# Patient Record
Sex: FEMALE | Race: WHITE | NOT HISPANIC OR LATINO | Employment: FULL TIME | URBAN - METROPOLITAN AREA
[De-identification: names, ages, dates, MRNs, and addresses within clinical notes are randomized per-mention and may not be internally consistent; named-entity substitution may affect disease eponyms.]

---

## 2017-02-13 ENCOUNTER — HOSPITAL ENCOUNTER (OUTPATIENT)
Dept: RADIOLOGY | Age: 57
Discharge: HOME | End: 2017-02-13
Payer: OTHER MISCELLANEOUS

## 2017-02-13 ENCOUNTER — TRANSCRIBE ORDERS (OUTPATIENT)
Dept: URGENT CARE | Age: 57
End: 2017-02-13

## 2017-02-13 ENCOUNTER — APPOINTMENT (OUTPATIENT)
Dept: URGENT CARE | Age: 57
End: 2017-02-13
Payer: OTHER MISCELLANEOUS

## 2017-02-13 DIAGNOSIS — R52 PAIN: ICD-10-CM

## 2017-02-13 DIAGNOSIS — R52 PAIN: Primary | ICD-10-CM

## 2017-02-13 PROCEDURE — 73030 X-RAY EXAM OF SHOULDER: CPT

## 2017-02-13 PROCEDURE — 99213 OFFICE O/P EST LOW 20 MIN: CPT | Performed by: FAMILY MEDICINE

## 2017-02-17 ENCOUNTER — APPOINTMENT (OUTPATIENT)
Dept: URGENT CARE | Age: 57
End: 2017-02-17
Payer: OTHER MISCELLANEOUS

## 2017-02-17 PROCEDURE — 99213 OFFICE O/P EST LOW 20 MIN: CPT | Performed by: FAMILY MEDICINE

## 2017-02-25 ENCOUNTER — APPOINTMENT (OUTPATIENT)
Dept: URGENT CARE | Age: 57
End: 2017-02-25
Payer: OTHER MISCELLANEOUS

## 2017-02-25 PROCEDURE — 99213 OFFICE O/P EST LOW 20 MIN: CPT | Performed by: PREVENTIVE MEDICINE

## 2017-03-04 ENCOUNTER — APPOINTMENT (OUTPATIENT)
Dept: URGENT CARE | Age: 57
End: 2017-03-04
Payer: OTHER MISCELLANEOUS

## 2017-03-04 PROCEDURE — 99213 OFFICE O/P EST LOW 20 MIN: CPT

## 2017-03-11 ENCOUNTER — APPOINTMENT (OUTPATIENT)
Dept: URGENT CARE | Age: 57
End: 2017-03-11
Payer: OTHER MISCELLANEOUS

## 2017-03-11 PROCEDURE — 99283 EMERGENCY DEPT VISIT LOW MDM: CPT

## 2017-03-11 PROCEDURE — G0382 LEV 3 HOSP TYPE B ED VISIT: HCPCS

## 2017-03-15 ENCOUNTER — APPOINTMENT (OUTPATIENT)
Dept: URGENT CARE | Age: 57
End: 2017-03-15
Payer: OTHER MISCELLANEOUS

## 2017-03-15 PROCEDURE — 99213 OFFICE O/P EST LOW 20 MIN: CPT | Performed by: FAMILY MEDICINE

## 2017-03-20 ENCOUNTER — APPOINTMENT (OUTPATIENT)
Dept: URGENT CARE | Age: 57
End: 2017-03-20
Payer: OTHER MISCELLANEOUS

## 2017-03-20 ENCOUNTER — TRANSCRIBE ORDERS (OUTPATIENT)
Dept: URGENT CARE | Age: 57
End: 2017-03-20

## 2017-03-20 ENCOUNTER — HOSPITAL ENCOUNTER (OUTPATIENT)
Dept: RADIOLOGY | Age: 57
Discharge: HOME | End: 2017-03-20
Attending: PREVENTIVE MEDICINE
Payer: OTHER MISCELLANEOUS

## 2017-03-20 DIAGNOSIS — T14.90XA INJURY: Primary | ICD-10-CM

## 2017-03-20 DIAGNOSIS — T14.90XA INJURY: ICD-10-CM

## 2017-03-20 PROCEDURE — 99213 OFFICE O/P EST LOW 20 MIN: CPT | Performed by: PREVENTIVE MEDICINE

## 2017-03-20 PROCEDURE — 72200 X-RAY EXAM SI JOINTS: CPT

## 2017-03-20 PROCEDURE — 72100 X-RAY EXAM L-S SPINE 2/3 VWS: CPT

## 2017-03-22 ENCOUNTER — APPOINTMENT (OUTPATIENT)
Dept: PHYSICAL THERAPY | Age: 57
End: 2017-03-22
Payer: OTHER MISCELLANEOUS

## 2017-03-22 PROCEDURE — 97162 PT EVAL MOD COMPLEX 30 MIN: CPT

## 2017-03-23 ENCOUNTER — APPOINTMENT (OUTPATIENT)
Dept: PHYSICAL THERAPY | Age: 57
End: 2017-03-23
Payer: OTHER MISCELLANEOUS

## 2017-03-23 PROCEDURE — 97110 THERAPEUTIC EXERCISES: CPT

## 2017-03-23 PROCEDURE — 97010 HOT OR COLD PACKS THERAPY: CPT

## 2017-03-27 ENCOUNTER — APPOINTMENT (OUTPATIENT)
Dept: PHYSICAL THERAPY | Age: 57
End: 2017-03-27
Payer: OTHER MISCELLANEOUS

## 2017-03-27 PROCEDURE — 97010 HOT OR COLD PACKS THERAPY: CPT

## 2017-03-27 PROCEDURE — 97110 THERAPEUTIC EXERCISES: CPT

## 2017-03-29 ENCOUNTER — APPOINTMENT (OUTPATIENT)
Dept: PHYSICAL THERAPY | Age: 57
End: 2017-03-29
Payer: OTHER MISCELLANEOUS

## 2017-03-29 ENCOUNTER — APPOINTMENT (OUTPATIENT)
Dept: URGENT CARE | Age: 57
End: 2017-03-29
Payer: OTHER MISCELLANEOUS

## 2017-03-29 PROCEDURE — 97010 HOT OR COLD PACKS THERAPY: CPT

## 2017-03-29 PROCEDURE — 97110 THERAPEUTIC EXERCISES: CPT

## 2017-03-29 PROCEDURE — 99213 OFFICE O/P EST LOW 20 MIN: CPT | Performed by: FAMILY MEDICINE

## 2017-03-30 ENCOUNTER — APPOINTMENT (OUTPATIENT)
Dept: PHYSICAL THERAPY | Age: 57
End: 2017-03-30
Payer: OTHER MISCELLANEOUS

## 2017-03-30 PROCEDURE — 97010 HOT OR COLD PACKS THERAPY: CPT

## 2017-03-30 PROCEDURE — 97110 THERAPEUTIC EXERCISES: CPT

## 2017-04-03 ENCOUNTER — APPOINTMENT (OUTPATIENT)
Dept: PHYSICAL THERAPY | Age: 57
End: 2017-04-03
Payer: OTHER MISCELLANEOUS

## 2017-04-03 PROCEDURE — 97010 HOT OR COLD PACKS THERAPY: CPT

## 2017-04-03 PROCEDURE — 97110 THERAPEUTIC EXERCISES: CPT

## 2017-04-05 ENCOUNTER — APPOINTMENT (OUTPATIENT)
Dept: PHYSICAL THERAPY | Age: 57
End: 2017-04-05
Payer: OTHER MISCELLANEOUS

## 2017-04-05 PROCEDURE — 97010 HOT OR COLD PACKS THERAPY: CPT

## 2017-04-05 PROCEDURE — 97110 THERAPEUTIC EXERCISES: CPT

## 2017-04-06 ENCOUNTER — APPOINTMENT (OUTPATIENT)
Dept: PHYSICAL THERAPY | Age: 57
End: 2017-04-06
Payer: OTHER MISCELLANEOUS

## 2017-04-06 PROCEDURE — 97110 THERAPEUTIC EXERCISES: CPT

## 2017-04-06 PROCEDURE — 97010 HOT OR COLD PACKS THERAPY: CPT

## 2017-04-07 ENCOUNTER — APPOINTMENT (OUTPATIENT)
Dept: URGENT CARE | Age: 57
End: 2017-04-07
Payer: OTHER MISCELLANEOUS

## 2017-04-07 PROCEDURE — 99213 OFFICE O/P EST LOW 20 MIN: CPT | Performed by: FAMILY MEDICINE

## 2017-04-11 ENCOUNTER — APPOINTMENT (OUTPATIENT)
Dept: PHYSICAL THERAPY | Age: 57
End: 2017-04-11
Payer: OTHER MISCELLANEOUS

## 2017-04-12 ENCOUNTER — APPOINTMENT (OUTPATIENT)
Dept: PHYSICAL THERAPY | Age: 57
End: 2017-04-12
Payer: OTHER MISCELLANEOUS

## 2017-04-12 PROCEDURE — 97110 THERAPEUTIC EXERCISES: CPT

## 2017-04-13 ENCOUNTER — APPOINTMENT (OUTPATIENT)
Dept: PHYSICAL THERAPY | Age: 57
End: 2017-04-13
Payer: OTHER MISCELLANEOUS

## 2017-04-13 PROCEDURE — 97110 THERAPEUTIC EXERCISES: CPT

## 2017-04-13 PROCEDURE — 97010 HOT OR COLD PACKS THERAPY: CPT

## 2017-04-18 ENCOUNTER — APPOINTMENT (OUTPATIENT)
Dept: PHYSICAL THERAPY | Age: 57
End: 2017-04-18
Payer: OTHER MISCELLANEOUS

## 2017-04-19 ENCOUNTER — APPOINTMENT (OUTPATIENT)
Dept: URGENT CARE | Age: 57
End: 2017-04-19
Payer: OTHER MISCELLANEOUS

## 2017-04-19 ENCOUNTER — APPOINTMENT (OUTPATIENT)
Dept: PHYSICAL THERAPY | Age: 57
End: 2017-04-19
Payer: OTHER MISCELLANEOUS

## 2017-04-19 PROCEDURE — 97110 THERAPEUTIC EXERCISES: CPT

## 2017-04-19 PROCEDURE — 99213 OFFICE O/P EST LOW 20 MIN: CPT | Performed by: PREVENTIVE MEDICINE

## 2017-06-02 ENCOUNTER — ALLSCRIPTS OFFICE VISIT (OUTPATIENT)
Dept: OTHER | Facility: OTHER | Age: 57
End: 2017-06-02

## 2017-10-13 ENCOUNTER — TRANSCRIBE ORDERS (OUTPATIENT)
Dept: URGENT CARE | Age: 57
End: 2017-10-13

## 2017-10-13 ENCOUNTER — APPOINTMENT (OUTPATIENT)
Dept: URGENT CARE | Age: 57
End: 2017-10-13
Payer: OTHER MISCELLANEOUS

## 2017-10-13 ENCOUNTER — APPOINTMENT (OUTPATIENT)
Dept: RADIOLOGY | Age: 57
End: 2017-10-13
Attending: PREVENTIVE MEDICINE
Payer: OTHER MISCELLANEOUS

## 2017-10-13 DIAGNOSIS — T14.90XA INJURY: ICD-10-CM

## 2017-10-13 DIAGNOSIS — T14.90XA INJURY: Primary | ICD-10-CM

## 2017-10-13 PROCEDURE — 73130 X-RAY EXAM OF HAND: CPT

## 2017-10-13 PROCEDURE — 73110 X-RAY EXAM OF WRIST: CPT

## 2017-10-13 PROCEDURE — G0382 LEV 3 HOSP TYPE B ED VISIT: HCPCS | Performed by: FAMILY MEDICINE

## 2017-10-13 PROCEDURE — 99283 EMERGENCY DEPT VISIT LOW MDM: CPT | Performed by: FAMILY MEDICINE

## 2017-10-18 ENCOUNTER — APPOINTMENT (OUTPATIENT)
Dept: URGENT CARE | Age: 57
End: 2017-10-18
Payer: OTHER MISCELLANEOUS

## 2017-10-18 PROCEDURE — 99213 OFFICE O/P EST LOW 20 MIN: CPT | Performed by: PREVENTIVE MEDICINE

## 2017-10-23 ENCOUNTER — APPOINTMENT (OUTPATIENT)
Dept: URGENT CARE | Age: 57
End: 2017-10-23
Payer: OTHER MISCELLANEOUS

## 2017-10-23 PROCEDURE — 99213 OFFICE O/P EST LOW 20 MIN: CPT | Performed by: PREVENTIVE MEDICINE

## 2017-10-30 ENCOUNTER — APPOINTMENT (OUTPATIENT)
Dept: URGENT CARE | Age: 57
End: 2017-10-30
Payer: OTHER MISCELLANEOUS

## 2017-10-30 PROCEDURE — 99213 OFFICE O/P EST LOW 20 MIN: CPT | Performed by: PREVENTIVE MEDICINE

## 2017-11-04 ENCOUNTER — APPOINTMENT (OUTPATIENT)
Dept: URGENT CARE | Age: 57
End: 2017-11-04
Payer: OTHER MISCELLANEOUS

## 2017-11-04 PROCEDURE — 99213 OFFICE O/P EST LOW 20 MIN: CPT | Performed by: PREVENTIVE MEDICINE

## 2017-11-12 ENCOUNTER — APPOINTMENT (OUTPATIENT)
Dept: URGENT CARE | Age: 57
End: 2017-11-12
Payer: OTHER MISCELLANEOUS

## 2017-11-12 PROCEDURE — 99213 OFFICE O/P EST LOW 20 MIN: CPT | Performed by: PREVENTIVE MEDICINE

## 2018-01-11 NOTE — RESULT NOTES
Verified Results  * MAMMO SCREENING BILATERAL W CAD [id number] 08:02AM Lashaun Miller     Test Name Result Flag Reference   MAMMO SCREENING BILATERAL W CAD (Report)     Patient History:   Patient is postmenopausal and has history of ovarian cancer at    age 51  Family history of breast cancer in mother at age 76 and breast    cancer in sister at age 48  Benign excisional biopsy of the left breast, July 13, 2002  Core   biopsy of the left breast  Core biopsy of the right breast    Patient is a former smoker  Patient's BMI is 31 2  Reason for exam: screening (asymptomatic)  Mammo Screening Bilateral W CAD: February 10, 2016 - Check In #:    [de-identified]   Bilateral CC and MLO view(s) were taken  Technologist: MILAGROS Marie (R)(M)   Prior study comparison: January 28, 2015, bilateral digital    screening mammogram, performed at [place] [address]  January 22, 2014, bilateral digital screening    mammogram, performed at [place] [address]  December 3, 2012, bilateral digital screening mammogram,   performed at [place] [address]  November 16, 2011, bilateral digital screening mammogram,    performed at [place] [address]  November 9, 2010, bilateral digital screening mammogram,    performed at [place] [address]  October 19, 2009, bilateral digital screening mammogram, performed at    [place] [address]  October 16, 2008,    bilateral DIGITAL SCREENING MAMMOGRAM, performed at [place] [address]  There are scattered fibroglandular densities  The parenchymal pattern appears stable  No dominant soft tissue    mass or suspicious calcifications are noted  Scattered benign    appearing calcifications are seen  Stable nodular densities are    seen in both breasts   The skin and nipple contours are within    normal limits  No mammographic evidence of malignancy  No    significant changes when compared with prior studies  ASSESSMENT: BiRad:2 - Benign     Recommendation:   Routine screening mammogram in 1 year  A reminder letter will be   scheduled  Analyzed by CAD     8-10% of cancers will be missed on mammography  Management of a    palpable abnormality must be based on clinical grounds  Patients   will be notified of their results via letter from our facility  Accredited by [employer] of [employer] and FDA  Transcription Location: [facility]: [id number]     Risk Value(s):   Tyrer-Cuzick 10 Year: 12 941%, Tyrer-Cuzick Lifetime: 34 305%,    Myriad Table: 7 7%, NAILA 5 Year: 9 3%, NCI Lifetime: 46 9%, MRS    : Based on personal and/or family history,    consideration of hereditary risk assessment may be warranted     Signed by:   Shlomo Yung MD   2/10/16

## 2018-01-14 VITALS
WEIGHT: 158.13 LBS | DIASTOLIC BLOOD PRESSURE: 82 MMHG | SYSTOLIC BLOOD PRESSURE: 132 MMHG | HEIGHT: 61 IN | RESPIRATION RATE: 18 BRPM | TEMPERATURE: 99.3 F | BODY MASS INDEX: 29.86 KG/M2 | HEART RATE: 72 BPM

## 2018-01-15 NOTE — PROGRESS NOTES
Assessment    1  Encounter for preventive health examination (V70 0) (Z00 00)   2  Flu vaccine need (V04 81) (Z23)    Plan  Coronary arteriosclerosis    · From  Simvastatin 80 MG Oral Tablet take one tablet by mouth one time daily  To Simvastatin 80 MG Oral Tablet take one-half  tablet by mouth one time daily or as  directed   · Metoprolol Tartrate 25 MG Oral Tablet; TAKE ONE-HALF TABLET BY MOUTH  TWICE DAILY   · (1) COMPREHENSIVE METABOLIC PANEL; Status:Active; Requested for:04Nov2016;   Flu vaccine need    · Fluzone Quadrivalent Intramuscular Suspension  Health Maintenance    · Vitamin D3 1000 UNIT Oral Capsule; take 1 capsule daily    Discussion/Summary  health maintenance visit has not seen Gyn- Oncology Discussed Breast cancer screening: mammogram is current and 2/16  Colorectal cancer screening: colorectal cancer screening is current and 2009  Osteoporosis screening: can use Vitamin D 1000  The rcvd flu shot  She was advised to be evaluated by an optometrist and a dentist      She has been feeling well since last here - no further dizziness, no CP , SOB etc  Carotids w no sig stenosis 2015, , CBC/CMP/ Lipid/UA/TSH were fine 2015 - redo CMP       BP elevated today Use Metoprolol 1/2 twice daily w goal 120-130/80 (was only on once a day)  Stay w Aspirin/ Re Simvstatin - w/ thigh cramps- ** decrease to 1/2 pill a day **  Control cardiac risk w CAD hx - declines stress testing or Cardiology re-eval for now  Visit here 6-8 months     Discussed re-eval w Gyn-Oncology- Dr Reyes Nephew - had stage I ovarian ca - Also w hx RLL lobectomy re early carcinoid lung - could do CT scanning chest/ abd/ pelvis - declines for now       Chief Complaint  Physical      History of Present Illness  HPI: in for check - had PE in Dec but wants PE as losing coverage x 3 mo - switching jobs away from [employer] to other [employer] facility    Notes some cramping thighs - only using Metoprolol 12 5 once a day  Remains on Simvastatin 80      Review of Systems    Constitutional: No fever, no chills, feels well, no tiredness, no recent weight gain or weight loss  Cardiovascular: No complaints of slow heart rate, no fast heart rate, no chest pain, no palpitations, no leg claudication, no lower extremity edema  Respiratory: No complaints of shortness of breath, no wheezing, no cough, no SOB on exertion, no orthopnea, no PND  Gastrointestinal: no reflux, but No complaints of abdominal pain, no constipation, no nausea or vomiting, no diarrhea, no bloody stools  Genitourinary: no dysuria and no pelvic pain  Musculoskeletal: some thigh cramping at times R > L, but no arthralgias  Integumentary: no rashes and no skin lesions  Neurological: no headache, no confusion, no dizziness, no limb weakness, no convulsions, no fainting and no difficulty walking  Psychiatric: Not suicidal, no sleep disturbance, no anxiety or depression, no change in personality, no emotional problems  Endocrine: No complaints of proptosis, no hot flashes, no muscle weakness, no deepening of the voice, no feelings of weakness  Hematologic/Lymphatic: No complaints of swollen glands, no swollen glands in the neck, does not bleed easily, does not bruise easily  Active Problems    1  History of Carcinoid Lung Neoplasm (235 7)   2  Carotid stenosis (433 10) (I65 29)   3  Coronary arteriosclerosis (414 00) (I25 10)   4  Deep Venous Insufficiency Of The Left Leg (459 81)   5  Heart murmur (785 2) (R01 1)   6  History of Lung Lobectomy   7  History of Ovarian Cancer (V10 43)   8  History of Thrombophlebitis (V12 52)   9  Tinnitus (388 30) (H93 19)   10   Vertigo (780 4) (R42)    Past Medical History    · History of Carcinoid Lung Neoplasm (235 7)   · History of Ovarian Cancer (V10 43)   · History of Thrombophlebitis (V12 52)    Surgical History    · History of Biopsy Breast Percutaneous Needle Core   · History of Cath Stent Placement   · History of Complete Colonoscopy   · History of Lung Lobectomy   · History of Total Abdominal Hysterectomy With Removal Of Both Ovaries   · History of Treatment Of Wrist Fracture    Family History  Mother    · Family history of Breast Cancer (V16 3)   · Family history of Diabetes Mellitus (V18 0)    Social History    · Former smoker (V15 82) (P22 765)   · Full-time employment   · warehouse work   · Minimum alcohol consumption    Current Meds   1  Aspirin 81 MG TABS; TAKE 1 TABLET DAILY; Therapy: (Maria Varsha) to Recorded   2  Caltrate 600+D 600-400 MG-UNIT TABS; Take 1 tablet twice daily; Therapy: (Maria Varsha) to Recorded   3  Fish Oil 1200 MG Oral Capsule; TAKE 1 CAPSULE Daily; Therapy: (Maria Varsha) to Recorded   4  Metoprolol Tartrate 25 MG Oral Tablet; TAKE ONE-HALF TABLET BY MOUTH TWICE   DAILY; Therapy: 20PLS6104 to (Evaluate:11Wdw5116)  Requested for: 20Hbm9374; Last   Rx:[id number] Ordered   5  Simvastatin 80 MG Oral Tablet; take one tablet by mouth one time daily; Therapy: 46FXB9162 to (Evaluate:89Nqr8541)  Requested for: 25Xlm8101; Last   Rx:[id number] Ordered    Allergies    1  Iodinated Contrast Media   2  Niacin TABS    Vitals   Recorded: 46JGX8675 73:64WC   Systolic 105   Diastolic 80   Heart Rate 68   Height 5 ft 1 in   Weight 170 lb 4 96 oz   BMI Calculated 32 18   BSA Calculated 1 76     Physical Exam    Constitutional   General appearance: No acute distress, well appearing and well nourished  Head and Face   Head and face: Normal     Palpation of the face and sinuses: No sinus tenderness  Neck   Neck: Supple, symmetric, trachea midline, no masses  Thyroid: Normal, no thyromegaly  Pulmonary   Auscultation of lungs: Clear to auscultation  Cardiovascular   Auscultation of heart: Normal rate and rhythm, normal S1 and S2, no murmurs  Carotid pulses: 2+ bilaterally  Examination of extremities for edema and/or varicosities: Normal     Lymphatic   Palpation of lymph nodes in neck: No lymphadenopathy  Musculoskeletal   Gait and station: Normal     Stability: Normal     Muscle strength/tone: Normal     Neurologic   Cortical function: Normal mental status  Coordination: Normal finger to nose and heel to shin  Psychiatric   Judgment and insight: Normal     Orientation to person, place, and time: Normal     Recent and remote memory: Intact  Mood and affect: Normal        Results/Data  PHQ-2 Adult Depression Screening [id number] 02:02PM User, Poncho     Test Name Result Flag Reference   PHQ-2 Adult Depression Score 0     Over the last two weeks, how often have you been bothered by any of the following problems?   Little interest or pleasure in doing things: Not at all - 0  Feeling down, depressed, or hopeless: Not at all - 0   PHQ-2 Adult Depression Screening Negative         Signatures   Electronically signed by : Chritsen Soria DO; Nov 4 2016  2:13PM EST                       (Author)

## 2018-01-16 NOTE — RESULT NOTES
Verified Results  (1) COMPREHENSIVE METABOLIC PANEL [id number] 85:64NR Jamari Roberts     Test Name Result Flag Reference   GLUCOSE,RANDM 104 mg/dL     If the patient is fasting, the ADA then defines impaired fasting glucose as > 100 mg/dL and diabetes as > or equal to 123 mg/dL  SODIUM 141 mmol/L  136-145   POTASSIUM 4 3 mmol/L  3 5-5 3   CHLORIDE 104 mmol/L  100-108   CARBON DIOXIDE 29 mmol/L  21-32   ANION GAP (CALC) 8 mmol/L  4-13   BLOOD UREA NITROGEN 12 mg/dL  5-25   CREATININE 0 68 mg/dL  0 60-1 30   Standardized to IDMS reference method   CALCIUM 9 0 mg/dL  8 3-10 1   BILI, TOTAL 1 16 mg/dL H 0 20-1 00   ALK PHOSPHATAS 40 U/L L    ALT (SGPT) 40 U/L  12-78   AST(SGOT) 21 U/L  5-45   ALBUMIN 3 7 g/dL  3 5-5 0   TOTAL PROTEIN 7 5 g/dL  6 4-8 2   eGFR Non-African American      >60 0 ml/min/1 73sq [facility] [employer] Disease Education Program recommendations are as follows:  GFR calculation is accurate only with a steady state creatinine  Chronic Kidney disease less than 60 ml/min/1 73 sq  meters  Kidney failure less than 15 ml/min/1 73 sq  meters

## 2018-04-09 DIAGNOSIS — I25.10 CORONARY ARTERIOSCLEROSIS: Primary | ICD-10-CM

## 2018-04-09 RX ORDER — AMOXICILLIN 500 MG
2 CAPSULE ORAL DAILY
COMMUNITY

## 2018-04-09 RX ORDER — SIMVASTATIN 80 MG
1 TABLET ORAL DAILY
COMMUNITY
Start: 2011-11-21 | End: 2018-04-09 | Stop reason: SDUPTHER

## 2018-04-09 RX ORDER — BIOTIN 1 MG
1 TABLET ORAL DAILY
COMMUNITY
Start: 2016-11-04

## 2018-04-10 RX ORDER — SIMVASTATIN 80 MG
80 TABLET ORAL DAILY
Qty: 90 TABLET | Refills: 0 | Status: SHIPPED | OUTPATIENT
Start: 2018-04-10 | End: 2018-05-14 | Stop reason: SDUPTHER

## 2018-04-10 NOTE — TELEPHONE ENCOUNTER
Phoned patient, betito on her mobile # asking patient to call back the office to set up a PE with Dr Rachel Day

## 2018-05-12 PROBLEM — Z90.710 S/P TAH-BSO: Status: ACTIVE | Noted: 2018-05-12

## 2018-05-12 PROBLEM — I25.2 HISTORY OF MYOCARDIAL INFARCTION: Status: ACTIVE | Noted: 2018-05-12

## 2018-05-12 PROBLEM — Z90.722 S/P TAH-BSO: Status: ACTIVE | Noted: 2018-05-12

## 2018-05-12 PROBLEM — Z95.5 H/O HEART ARTERY STENT: Status: ACTIVE | Noted: 2018-05-12

## 2018-05-12 PROBLEM — C56.9 OVARIAN CANCER (HCC): Status: ACTIVE | Noted: 2018-05-12

## 2018-05-12 PROBLEM — C56.1 MALIGNANT NEOPLASM OF RIGHT OVARY (HCC): Status: ACTIVE | Noted: 2018-05-12

## 2018-05-12 PROBLEM — Z90.79 S/P TAH-BSO: Status: ACTIVE | Noted: 2018-05-12

## 2018-05-12 PROBLEM — D3A.090 CARCINOID TUMOR OF LUNG: Status: ACTIVE | Noted: 2018-05-12

## 2018-05-12 NOTE — PROGRESS NOTES
FAMILY PRACTICE OFFICE VISIT  Tana JIMENEZ O  [facility]  [address] [facility] [address]  [address] [place], [place], [address]      NAME: Lc Vann  AGE: 61 y o  SEX: female  : 1960   MRN: [id number]    DATE: 2018  TIME: 9:07 AM    Assessment and Plan     Problem List Items Addressed This Visit        Respiratory    Carcinoid tumor of lung       Cardiovascular and Mediastinum    Coronary arteriosclerosis    Relevant Medications    simvastatin (ZOCOR) 80 mg tablet    metoprolol tartrate (LOPRESSOR) 25 mg tablet    Other Relevant Orders    Comprehensive metabolic panel    CBC    Lipid panel       Genitourinary    Malignant neoplasm of right ovary (HCC)       Other    H/O heart artery stent    History of myocardial infarction      Other Visit Diagnoses     Well adult health check    -  Primary    Need for hepatitis C screening test        Relevant Orders    Hepatitis C antibody    Essential hypertension        Relevant Medications    metoprolol tartrate (LOPRESSOR) 25 mg tablet    Other Relevant Orders    Urinalysis with microscopic          Patient Instructions     She is in today for a routine physical/ check up      She is doing well here today   But her blood pressure is not ideal, she will continue on metoprolol as is, I would like her to return in 1 month for staff to do blood pressure check, I would also like her to check blood pressure outside of office, if blood pressure remains high we will plan to add valsartan  mg          Immunization History   Administered Date(s) Administered    Influenza Quadrivalent, 6-35 Months IM 2016    Influenza TIV (IM) 12/10/2012       She does not do yearly Flu shot     Should do  Tdap/tetanus shot will be done at a future date  (done every 10 yrs for superficial cuts, every 5 yrs for deep wounds)   Can also look into coverage for new shingles shot, Shingrix (indicated if over 48years of age ) Can do that at pharmacy  Is a former smoker, quit 10 years ago     Regarding Colon Cancer screening, we discussed Colonoscopy vs ColoGuard is indicated starting at age 48     2009 was last colonoscopy -  She has number to set up [facility]/ Dr Tamica Rodriguez)    She does not see her Gynecologist routinely  see other info re SEKOU BSO re st I ovarian cancer  Discussed screening Mammogram, this is   done every 1-2 yrs, has slip to do one  Hepatis C Screening indicated for 'baby boomers' -  after discussion she will do Hepatitis C screen  low risk    We did review previous blood work, last TSH ok 2015   Slip given for fasting Lipid/ CMP/CBC  She is not up to date with Lipid screening  Last LDL 66 2015 -  Is on statin, Aspirin, BBlocker re MI hx/ CAD w hx stent -   Discussed doing Cardiology evaluation/ stress testing -  She wishes to hold off, use meds as is and needs eval if any CP or inc SOB  She is not up to date with Diabetes screening  Discussed importance of routine exercise, healthy diet  Remains active w work      Also - should see Dentist routinely, and also should see Eye Dr if any vision changes  We will see her again in 6 months, sooner as needed             Chief Complaint     Chief Complaint   Patient presents with    Physical Exam       History of Present Illness   Mariam Tobin is a 61y o -year-old female who  is in today for a regular check, since I last saw her she has been feeling well, she had undergone physical therapy regarding a worker's Comp injury a little over 1 year ago  Also had injury right hand  Continues to be very active with work, working at [address] [place] [address] Road: Negative for activity change, appetite change, fatigue, fever and unexpected weight change  HENT: Negative for mouth sores, sore throat and trouble swallowing  Eyes: Negative for visual disturbance     Respiratory: Negative for cough, chest tightness and shortness of breath  Remote history lobectomy regarding early carcinoid tumor, no follow-up was advised, she is over 10 years out from smoking cessation, no shortness of breath, cough, dyspnea on exertion  Cardiovascular: Negative for chest pain, palpitations and leg swelling  No exertional complaints, feels very good at work   Gastrointestinal: Negative for abdominal pain and blood in stool  Genitourinary: Negative for dysuria and hematuria  She does not see a gynecologist, remote history SEKOU BSO, had presented with large left ovarian cyst, incidentally found to have right stage I ovarian cancer  Musculoskeletal: Negative for back pain  Neurological: Negative for dizziness, light-headedness and headaches  Hematological: Does not bruise/bleed easily  Psychiatric/Behavioral: Negative for behavioral problems  Active Problem List     Patient Active Problem List   Diagnosis    Vertigo    Tinnitus    Heart murmur    Venous (peripheral) insufficiency    Coronary arteriosclerosis    Carotid stenosis    H/O heart artery stent    History of myocardial infarction    Carcinoid tumor of lung    Malignant neoplasm of right ovary ([address] )    S/P SEKOU-BSO       Past Medical History:  Past Medical History:   Diagnosis Date    Carcinoid tumor of lung 2009    Carcinoid lung neoplasm - stage 1 w resection 2010; last assessed - 17Jun2013    Ovarian cancer ([facility] [address] ) [id number]    Right stage 1; 67SCE1525    Thrombophlebitis of arm, left     Thrombophlebitis left; last assessed - 29MDE5522       Past Surgical History:  Past Surgical History:   Procedure Laterality Date    BREAST BIOPSY      Biopsy Breast Percutaneous needle core    CLOSED REDUCTION WRIST FRACTURE Left 07/20/2011    COLONOSCOPY  05/2009    Complete Colonoscopy    CORONARY ANGIOPLASTY WITH STENT PLACEMENT      LUNG LOBECTOMY      RLL w/early stage carcinoid tumor;  Resolved - Approx [id number]    TOTAL ABDOMINAL HYSTERECTOMY W/ BILATERAL SALPINGOOPHORECTOMY         Family History:  Family History   Problem Relation Age of Onset   Joceline Mcneal Breast cancer Mother     Diabetes Mother      Diabetes Mellitus       Social History:  Social History     Social History    Marital status:      Spouse name: N/A    Number of children: N/A    Years of education: N/A     Occupational History    Warehouse work - Full-Time      Social History Main Topics    Smoking status: Former Smoker    Smokeless tobacco: Never Used    Alcohol use Yes      Comment: minimum alcohol consumption    Drug use: Unknown    Sexual activity: Not on file     Other Topics Concern    Not on file     Social History Narrative    No narrative on file       Objective     Vitals:    05/14/18 0825   BP: 158/82   Pulse: 56   Resp: 18   SpO2: 98%   Weight: 73 5 kg (162 lb)   Height: 5' 1" (1 549 m)     Body mass index is 30 61 kg/m²  BP Readings from Last 3 Encounters:   05/14/18 158/82   06/02/17 132/82   11/04/16 154/80       Wt Readings from Last 3 Encounters:   05/14/18 73 5 kg (162 lb)   06/02/17 71 7 kg (158 lb 2 1 oz)   11/04/16 77 3 kg (170 lb 4 9 oz)       Physical Exam   Constitutional: She is oriented to person, place, and time  She appears well-developed and well-nourished  No distress  HENT:   Mouth/Throat: Oropharynx is clear and moist  No oropharyngeal exudate  TM clear   Eyes: Conjunctivae are normal  No scleral icterus  Cardiovascular: Normal rate and regular rhythm  Murmur (1/6 GIOVANA LSB) heard  No carotid bruit   Pulmonary/Chest: Effort normal and breath sounds normal  No respiratory distress  Abdominal: Soft  There is no tenderness  Musculoskeletal: She exhibits no edema  Lymphadenopathy:     She has no cervical adenopathy  Neurological: She is alert and oriented to person, place, and time  Psychiatric: She has a normal mood and affect   Her behavior is normal        ALLERGIES:  Allergies   Allergen Reactions    Contrast  [Iodinated Diagnostic Agents] Hives    Iodine      Other reaction(s): Other (See Comments)  Contrast dye    Niacin      [facility] - [id number]: felt poor w med       Current Medications     Current Outpatient Prescriptions   Medication Sig Dispense Refill    aspirin 81 MG tablet Take 1 tablet by mouth daily      Cholecalciferol (VITAMIN D3) 1000 units CAPS Take 1 capsule by mouth daily      metoprolol tartrate (LOPRESSOR) 25 mg tablet Take 0 5 tablets (12 5 mg total) by mouth 2 (two) times a day 90 tablet 3    Omega-3 Fatty Acids (FISH OIL) 1200 MG CAPS Take 1 capsule by mouth daily      simvastatin (ZOCOR) 80 mg tablet Take 1 tablet (80 mg total) by mouth daily 90 tablet 3     No current facility-administered medications for this visit                Most recent labs available from [address] [place] [address]   ( others may be available in [facility] / Media sections)  Lab Results   Component Value Date    WBC 4 64 07/01/2015    HGB 13 1 07/01/2015    HCT 40 0 07/01/2015     07/01/2015    CHOL 124 07/01/2015    TRIG 65 07/01/2015    HDL 45 07/01/2015    ALT 40 11/05/2016    AST 21 11/05/2016     11/05/2016    K 4 3 11/05/2016     11/05/2016    CREATININE 0 68 11/05/2016    BUN 12 11/05/2016    CO2 29 11/05/2016     Lab Results   Component Value Date    LDLCALC 66 07/01/2015     No results found for: TSH      Orders Placed This Encounter   Procedures    Hepatitis C antibody    Comprehensive metabolic panel    CBC    Lipid panel    Urinalysis with microscopic    Hm Colonoscopy         Fani Rosario DO

## 2018-05-12 NOTE — PATIENT INSTRUCTIONS
She is in today for a routine physical/ check up      She is doing well here today   But her blood pressure is not ideal, she will continue on metoprolol as is, I would like her to return in 1 month for staff to do blood pressure check, I would also like her to check blood pressure outside of office, if blood pressure remains high we will plan to add valsartan  mg          Immunization History   Administered Date(s) Administered    Influenza Quadrivalent, 6-35 Months IM 11/04/2016    Influenza TIV (IM) 12/10/2012       She does not do yearly Flu shot  Should do  Tdap/tetanus shot will be done at a future date  (done every 10 yrs for superficial cuts, every 5 yrs for deep wounds)   Can also look into coverage for new shingles shot, Shingrix (indicated if over 48years of age ) Can do that at pharmacy  Is a former smoker, quit 10 years ago     Regarding Colon Cancer screening, we discussed Colonoscopy vs ColoGuard is indicated starting at age 48     2009 was last colonoscopy -  [facility] Dr Florence Greene)    She does not see her Gynecologist routinely  see other info re SEKOU BSO re st I ovarian cancer  Discussed screening Mammogram, this is   done every 1-2 yrs, has slip to do one  Hepatis C Screening indicated for 'baby boomers' -  after discussion she will do Hepatitis C screen  low risk    We did review previous blood work, last TSH ok 2015   Slip given for fasting Lipid/ CMP/CBC  She is not up to date with Lipid screening  Last LDL 66 2015 -  Is on statin, Aspirin, BBlocker re MI hx/ CAD w hx stent -   Discussed doing Cardiology evaluation/ stress testing -  She wishes to hold off, use meds as is and needs eval if any CP or inc SOB  She is not up to date with Diabetes screening  Discussed importance of routine exercise, healthy diet  Remains active w work      Also - should see Dentist routinely, and also should see Eye Dr if any vision changes      We will see her again in 6 months, sooner as needed

## 2018-05-14 ENCOUNTER — OFFICE VISIT (OUTPATIENT)
Dept: FAMILY MEDICINE CLINIC | Facility: CLINIC | Age: 58
End: 2018-05-14
Payer: COMMERCIAL

## 2018-05-14 VITALS
DIASTOLIC BLOOD PRESSURE: 82 MMHG | HEIGHT: 61 IN | HEART RATE: 56 BPM | RESPIRATION RATE: 18 BRPM | BODY MASS INDEX: 30.58 KG/M2 | WEIGHT: 162 LBS | SYSTOLIC BLOOD PRESSURE: 158 MMHG | OXYGEN SATURATION: 98 %

## 2018-05-14 DIAGNOSIS — Z11.59 NEED FOR HEPATITIS C SCREENING TEST: ICD-10-CM

## 2018-05-14 DIAGNOSIS — Z00.00 WELL ADULT HEALTH CHECK: Primary | ICD-10-CM

## 2018-05-14 DIAGNOSIS — I10 ESSENTIAL HYPERTENSION: ICD-10-CM

## 2018-05-14 DIAGNOSIS — I25.2 HISTORY OF MYOCARDIAL INFARCTION: ICD-10-CM

## 2018-05-14 DIAGNOSIS — C56.1 MALIGNANT NEOPLASM OF RIGHT OVARY (HCC): ICD-10-CM

## 2018-05-14 DIAGNOSIS — I25.10 CORONARY ARTERIOSCLEROSIS: ICD-10-CM

## 2018-05-14 DIAGNOSIS — Z95.5 H/O HEART ARTERY STENT: ICD-10-CM

## 2018-05-14 DIAGNOSIS — D3A.090 CARCINOID TUMOR OF LUNG: ICD-10-CM

## 2018-05-14 PROCEDURE — 99396 PREV VISIT EST AGE 40-64: CPT | Performed by: FAMILY MEDICINE

## 2018-05-14 RX ORDER — SIMVASTATIN 80 MG
80 TABLET ORAL DAILY
Qty: 90 TABLET | Refills: 3 | Status: SHIPPED | OUTPATIENT
Start: 2018-05-14 | End: 2018-05-23 | Stop reason: SDUPTHER

## 2018-05-23 DIAGNOSIS — I25.10 CORONARY ARTERIOSCLEROSIS: ICD-10-CM

## 2018-05-24 RX ORDER — SIMVASTATIN 80 MG
80 TABLET ORAL DAILY
Qty: 30 TABLET | Refills: 1 | Status: SHIPPED | OUTPATIENT
Start: 2018-05-24 | End: 2019-07-02 | Stop reason: SDUPTHER

## 2018-05-29 ENCOUNTER — TELEPHONE (OUTPATIENT)
Dept: FAMILY MEDICINE CLINIC | Facility: CLINIC | Age: 58
End: 2018-05-29

## 2018-05-29 NOTE — TELEPHONE ENCOUNTER
Pt called and left a message on the nurse line stating there was some miscommunication at the pharmacy regarding her simvastatin and she would like to know if it would be ok for her to stop taking it for 30 days  I called pt back to find out more information but no answer  LMOVM for her to call back regarding her message

## 2018-05-29 NOTE — TELEPHONE ENCOUNTER
Pt called back and explained that [facility] had made a mistake when they mailed her medications and sent her 2 bottles of metoprolol and no bottles of simvastatin and when she called them about it, they told her she had waited too long and there was nothing they could do about it so she cannot get it until July  Pt spoke with kolby and was told it would cost $55 for her to get it because insurance will not cover it since the simvastatin that was supposedly sent to her was through the insurance  Pt wanted to know if she should just stop for a month or if she should just pay for it  Please advise

## 2018-05-30 NOTE — TELEPHONE ENCOUNTER
CALL HER -   [employer] site states she can print a coupon for 90 of the 80mg Simvastatin and pay $15 52 at [facility]  -   Please have her try that  It really does not sound fair that they sent wrong med and will not cover it though

## 2018-06-01 ENCOUNTER — APPOINTMENT (OUTPATIENT)
Dept: LAB | Facility: HOSPITAL | Age: 58
End: 2018-06-01
Payer: COMMERCIAL

## 2018-06-01 DIAGNOSIS — Z11.59 NEED FOR HEPATITIS C SCREENING TEST: ICD-10-CM

## 2018-06-01 LAB
ALBUMIN SERPL BCP-MCNC: 3.7 G/DL (ref 3.5–5)
ALP SERPL-CCNC: 60 U/L (ref 46–116)
ALT SERPL W P-5'-P-CCNC: 41 U/L (ref 12–78)
ANION GAP SERPL CALCULATED.3IONS-SCNC: 6 MMOL/L (ref 4–13)
AST SERPL W P-5'-P-CCNC: 27 U/L (ref 5–45)
BACTERIA UR QL AUTO: ABNORMAL /HPF
BILIRUB SERPL-MCNC: 0.96 MG/DL (ref 0.2–1)
BILIRUB UR QL STRIP: NEGATIVE
BUN SERPL-MCNC: 13 MG/DL (ref 5–25)
CALCIUM SERPL-MCNC: 9.6 MG/DL (ref 8.3–10.1)
CHLORIDE SERPL-SCNC: 107 MMOL/L (ref 100–108)
CHOLEST SERPL-MCNC: 154 MG/DL (ref 50–200)
CLARITY UR: CLEAR
CO2 SERPL-SCNC: 31 MMOL/L (ref 21–32)
COLOR UR: YELLOW
CREAT SERPL-MCNC: 0.69 MG/DL (ref 0.6–1.3)
ERYTHROCYTE [DISTWIDTH] IN BLOOD BY AUTOMATED COUNT: 13.1 % (ref 11.6–15.1)
GFR SERPL CREATININE-BSD FRML MDRD: 96 ML/MIN/1.73SQ M
GLUCOSE P FAST SERPL-MCNC: 106 MG/DL (ref 65–99)
GLUCOSE UR STRIP-MCNC: NEGATIVE MG/DL
HCT VFR BLD AUTO: 41.9 % (ref 34.8–46.1)
HDLC SERPL-MCNC: 62 MG/DL (ref 40–60)
HGB BLD-MCNC: 14 G/DL (ref 11.5–15.4)
HGB UR QL STRIP.AUTO: ABNORMAL
KETONES UR STRIP-MCNC: NEGATIVE MG/DL
LDLC SERPL CALC-MCNC: 86 MG/DL (ref 0–100)
LEUKOCYTE ESTERASE UR QL STRIP: NEGATIVE
MCH RBC QN AUTO: 29.9 PG (ref 26.8–34.3)
MCHC RBC AUTO-ENTMCNC: 33.4 G/DL (ref 31.4–37.4)
MCV RBC AUTO: 89 FL (ref 82–98)
NITRITE UR QL STRIP: NEGATIVE
NON-SQ EPI CELLS URNS QL MICRO: ABNORMAL /HPF
NONHDLC SERPL-MCNC: 92 MG/DL
PH UR STRIP.AUTO: 5.5 [PH] (ref 4.5–8)
PLATELET # BLD AUTO: 169 THOUSANDS/UL (ref 149–390)
PMV BLD AUTO: 9.8 FL (ref 8.9–12.7)
POTASSIUM SERPL-SCNC: 4.9 MMOL/L (ref 3.5–5.3)
PROT SERPL-MCNC: 7.4 G/DL (ref 6.4–8.2)
PROT UR STRIP-MCNC: NEGATIVE MG/DL
RBC # BLD AUTO: 4.69 MILLION/UL (ref 3.81–5.12)
RBC #/AREA URNS AUTO: ABNORMAL /HPF
SODIUM SERPL-SCNC: 144 MMOL/L (ref 136–145)
SP GR UR STRIP.AUTO: 1.02 (ref 1–1.03)
TRIGL SERPL-MCNC: 30 MG/DL
UROBILINOGEN UR QL STRIP.AUTO: 0.2 E.U./DL
WBC # BLD AUTO: 3.37 THOUSAND/UL (ref 4.31–10.16)
WBC #/AREA URNS AUTO: ABNORMAL /HPF

## 2018-06-01 PROCEDURE — 80053 COMPREHEN METABOLIC PANEL: CPT | Performed by: FAMILY MEDICINE

## 2018-06-01 PROCEDURE — 80061 LIPID PANEL: CPT | Performed by: FAMILY MEDICINE

## 2018-06-01 PROCEDURE — 81001 URINALYSIS AUTO W/SCOPE: CPT | Performed by: FAMILY MEDICINE

## 2018-06-01 PROCEDURE — 36415 COLL VENOUS BLD VENIPUNCTURE: CPT | Performed by: FAMILY MEDICINE

## 2018-06-01 PROCEDURE — 86803 HEPATITIS C AB TEST: CPT

## 2018-06-01 PROCEDURE — 85027 COMPLETE CBC AUTOMATED: CPT | Performed by: FAMILY MEDICINE

## 2018-06-02 LAB — HCV AB SER QL: ABNORMAL

## 2018-06-03 DIAGNOSIS — Z20.5 EXPOSURE TO HEPATITIS C: Primary | ICD-10-CM

## 2018-06-05 ENCOUNTER — APPOINTMENT (OUTPATIENT)
Dept: LAB | Facility: HOSPITAL | Age: 58
End: 2018-06-05
Payer: COMMERCIAL

## 2018-06-05 DIAGNOSIS — Z20.5 EXPOSURE TO HEPATITIS C: ICD-10-CM

## 2018-06-05 PROCEDURE — 87521 HEPATITIS C PROBE&RVRS TRNSC: CPT

## 2018-06-05 PROCEDURE — 36415 COLL VENOUS BLD VENIPUNCTURE: CPT

## 2018-06-08 LAB — HCV RNA SERPL QL NAA+PROBE: NEGATIVE

## 2018-06-20 ENCOUNTER — CLINICAL SUPPORT (OUTPATIENT)
Dept: FAMILY MEDICINE CLINIC | Facility: CLINIC | Age: 58
End: 2018-06-20

## 2018-06-20 VITALS
SYSTOLIC BLOOD PRESSURE: 132 MMHG | DIASTOLIC BLOOD PRESSURE: 76 MMHG | BODY MASS INDEX: 29.78 KG/M2 | OXYGEN SATURATION: 98 % | HEART RATE: 57 BPM | WEIGHT: 157.6 LBS

## 2018-06-20 DIAGNOSIS — I10 HYPERTENSION, UNSPECIFIED TYPE: Primary | ICD-10-CM

## 2018-06-20 NOTE — PROGRESS NOTES
Call pt-     Her home number are acceptable -     Hold off on adding med and we will see her in Nov -  She can check her BP once a week or so    Patient was in for a nurse visit for her blood pressure check   She had given me her numbers that she has logged:  6/19 131/74  6/18 149/85  6/16 129/75  6/14 108/67  6/13 119/66  6/12 114/66  6/11 141/84  6/9 110/61  6/8 123/70  6/7 142/80  5/28 163/89  5/27 153/80  5/25 117/68  5/24 137/74  5/23 126/79  5/22 137/71  5/21 129/79    Patient has been taking her bp between the hours of 7-9 at bedtime

## 2018-10-03 ENCOUNTER — APPOINTMENT (OUTPATIENT)
Dept: URGENT CARE | Age: 58
End: 2018-10-03
Payer: OTHER MISCELLANEOUS

## 2018-10-03 PROCEDURE — 99283 EMERGENCY DEPT VISIT LOW MDM: CPT | Performed by: PREVENTIVE MEDICINE

## 2018-10-03 PROCEDURE — G0382 LEV 3 HOSP TYPE B ED VISIT: HCPCS | Performed by: PREVENTIVE MEDICINE

## 2018-10-08 ENCOUNTER — APPOINTMENT (OUTPATIENT)
Dept: URGENT CARE | Age: 58
End: 2018-10-08
Payer: OTHER MISCELLANEOUS

## 2018-10-08 PROCEDURE — 99213 OFFICE O/P EST LOW 20 MIN: CPT | Performed by: PREVENTIVE MEDICINE

## 2018-11-27 RX ORDER — CYCLOBENZAPRINE HCL 10 MG
TABLET ORAL
Refills: 0 | COMMUNITY
Start: 2018-10-03 | End: 2018-09-17

## 2018-11-29 ENCOUNTER — OFFICE VISIT (OUTPATIENT)
Dept: FAMILY MEDICINE CLINIC | Facility: CLINIC | Age: 58
End: 2018-11-29
Payer: COMMERCIAL

## 2018-11-29 VITALS
BODY MASS INDEX: 29.11 KG/M2 | HEART RATE: 62 BPM | HEIGHT: 61 IN | SYSTOLIC BLOOD PRESSURE: 130 MMHG | WEIGHT: 154.2 LBS | DIASTOLIC BLOOD PRESSURE: 76 MMHG | OXYGEN SATURATION: 98 %

## 2018-11-29 DIAGNOSIS — Z12.31 SCREENING MAMMOGRAM, ENCOUNTER FOR: ICD-10-CM

## 2018-11-29 DIAGNOSIS — I25.10 CORONARY ARTERIOSCLEROSIS: Primary | ICD-10-CM

## 2018-11-29 PROCEDURE — 3008F BODY MASS INDEX DOCD: CPT | Performed by: FAMILY MEDICINE

## 2018-11-29 PROCEDURE — 1036F TOBACCO NON-USER: CPT | Performed by: FAMILY MEDICINE

## 2018-11-29 PROCEDURE — 99213 OFFICE O/P EST LOW 20 MIN: CPT | Performed by: FAMILY MEDICINE

## 2018-11-29 NOTE — PROGRESS NOTES
FAMILY PRACTICE OFFICE VISIT  [facility]  [address], [address]      NAME: Gabriela Ramirez  AGE: 60 y o  SEX: female  : 1960   MRN: [id number]    DATE: 2018  TIME: 8:10 AM    Assessment and Plan     Problem List Items Addressed This Visit        Unprioritized    Coronary arteriosclerosis - Primary    Relevant Orders    Comprehensive metabolic panel    Lipid panel    CBC      Other Visit Diagnoses     Screening mammogram, encounter for        Relevant Orders    Mammo screening bilateral w cad          Patient Instructions   Her blood pressure here today is 130/76, continue to monitor blood pressure/decrease cardiovascular risk as can  Continue on aspirin 81 mg daily, metoprolol 12 5 mg twice daily along with simvastatin 80 mg daily  She remains very active at work, weight is down another 8 lb, continue to watch healthy diet  Earlier this year blood work showed CMP within normal limits other than glucose 106, CBC normal, cholesterol 154 with HDL 62, LDL 86, urinalysis negative  Hep C initially showed low reactive but follow-up testing was negative  Last TSH 2015 was fine  Slip given to do fasting blood work prior to next visit, we will see her in late May or  for yearly physical       Chief Complaint     Chief Complaint   Patient presents with    Follow-up     6 month check up        History of Present Illness   Gabriela Ramirez is a 60y o -year-old female who is in today for a 6 month check, I had seen her back in May for a full physical   Since that time she has been feeling well, working long hours, very active at work, no chest pain, shortness of breath or other issues  Review of Systems   Review of Systems   Constitutional: Negative for appetite change, fatigue, fever and unexpected weight change  HENT: Negative for sore throat and trouble swallowing      Respiratory: Negative for cough, chest tightness and shortness of breath  Cardiovascular: Negative for chest pain, palpitations and leg swelling  Gastrointestinal: Negative for abdominal pain and blood in stool  No acid reflux     No change in bowel   Genitourinary: Negative for dysuria and hematuria  Neurological: Negative for dizziness, light-headedness and headaches  Active Problem List     Patient Active Problem List   Diagnosis    Vertigo    Tinnitus    Heart murmur    Venous (peripheral) insufficiency    Coronary arteriosclerosis    Carotid stenosis    H/O heart artery stent    History of myocardial infarction    Carcinoid tumor of lung    [facility]    S/P SEKOU-BSO       Past Medical History:  Past Medical History:   Diagnosis Date    Carcinoid tumor of lung 2009    Carcinoid lung neoplasm - stage 1 w resection 2010; last assessed - 17Jun2013    Ovarian cancer ([facility] ) [id number]    Right stage 1; [id number]    Thrombophlebitis of arm, left     Thrombophlebitis left; last assessed - [id number]       Past Surgical History:  Past Surgical History:   Procedure Laterality Date    BREAST BIOPSY      Biopsy Breast Percutaneous needle core    CLOSED REDUCTION WRIST FRACTURE Left 07/20/2011    COLONOSCOPY  05/2009    Complete Colonoscopy    CORONARY ANGIOPLASTY WITH STENT PLACEMENT      LUNG LOBECTOMY      RLL w/early stage carcinoid tumor;  Resolved - Approx [id number]    TOTAL ABDOMINAL HYSTERECTOMY W/ BILATERAL SALPINGOOPHORECTOMY         Family History:  Family History   Problem Relation Age of Onset   Zuri Nine Breast cancer Mother     Diabetes Mother         Diabetes Mellitus       Social History:  Social History     Social History    Marital status:      Spouse name: N/A    Number of children: N/A    Years of education: N/A     Occupational History    Warehouse work - Full-Time      Social History Main Topics    Smoking status: Former Smoker    Smokeless tobacco: Never Used    Alcohol use Yes      Comment: minimum alcohol consumption    Drug use: Unknown    Sexual activity: Not on file     Other Topics Concern    Not on file     Social History Narrative    No narrative on file       Objective     Vitals:    11/29/18 0742   BP: 130/76   BP Location: Left arm   Patient Position: Sitting   Cuff Size: Large   Pulse: 62   SpO2: 98%   Weight: 69 9 kg (154 lb 3 2 oz)   Height: 5' 1" (1 549 m)     Body mass index is 29 14 kg/m²  BP Readings from Last 3 Encounters:   11/29/18 130/76   06/20/18 132/76   05/14/18 158/82       Wt Readings from Last 3 Encounters:   11/29/18 69 9 kg (154 lb 3 2 oz)   06/20/18 71 5 kg (157 lb 9 6 oz)   05/14/18 73 5 kg (162 lb)       Physical Exam   Constitutional: She is oriented to person, place, and time  She appears well-developed and well-nourished  No distress  Eyes: Conjunctivae are normal  No scleral icterus  Cardiovascular: Normal rate and regular rhythm  Murmur (1/6 GIOVANA LSB) heard  Pulmonary/Chest: Effort normal and breath sounds normal  No respiratory distress  Musculoskeletal: She exhibits no edema  Lymphadenopathy:     She has no cervical adenopathy  Neurological: She is alert and oriented to person, place, and time  ALLERGIES:  Allergies   Allergen Reactions    Contrast  [Iodinated Diagnostic Agents] Hives    Iodine      Other reaction(s):  Other (See Comments)  Contrast dye    Niacin      Annotation - [id number]: felt poor w med       Current Medications     Current Outpatient Prescriptions   Medication Sig Dispense Refill    aspirin 81 MG tablet Take 1 tablet by mouth daily      Cholecalciferol (VITAMIN D3) 1000 units CAPS Take 1 capsule by mouth daily      metoprolol tartrate (LOPRESSOR) 25 mg tablet Take 0 5 tablets (12 5 mg total) by mouth 2 (two) times a day 90 tablet 3    Omega-3 Fatty Acids (FISH OIL) 1200 MG CAPS Take 2 capsules by mouth daily        simvastatin (ZOCOR) 80 mg tablet Take 1 tablet (80 mg total) by mouth daily 30 tablet 1    cyclobenzaprine (FLEXERIL) 10 mg tablet TK 1 T PO Q 8 H OR QHS PRN FOR MUSCLE SPASMS  0     No current facility-administered medications for this visit                Most recent labs available from [address] [place] [address]   ( others may be available in [facility] / Media sections)  Lab Results   Component Value Date    WBC 3 37 (L) 06/01/2018    HGB 14 0 06/01/2018    HCT 41 9 06/01/2018     06/01/2018    CHOL 124 07/01/2015    TRIG 30 06/01/2018    HDL 62 (H) 06/01/2018    ALT 41 06/01/2018    AST 27 06/01/2018     07/01/2015    K 4 9 06/01/2018     06/01/2018    CREATININE 0 69 06/01/2018    BUN 13 06/01/2018    CO2 31 06/01/2018    GLUF 106 (H) 06/01/2018     Lab Results   Component Value Date    LDLCALC 86 06/01/2018     Lab Results   Component Value Date    [id number] 1 053 07/01/2015    [id number] 1 053 07/01/2015         Orders Placed This Encounter   Procedures    Mammo screening bilateral w cad    Comprehensive metabolic panel    Lipid panel    CBC         Donnie Ghosh DO

## 2018-11-29 NOTE — PATIENT INSTRUCTIONS
Her blood pressure here today is 130/76, continue to monitor blood pressure/decrease cardiovascular risk as can  Continue on aspirin 81 mg daily, metoprolol 12 5 mg twice daily along with simvastatin 80 mg daily  She remains very active at work, weight is down another 8 lb, continue to watch healthy diet  Earlier this year blood work showed CMP within normal limits other than glucose 106, CBC normal, cholesterol 154 with HDL 62, LDL 86, urinalysis negative  Hep C initially showed low reactive but follow-up testing was negative  Last TSH 2015 was fine    Slip given to do fasting blood work prior to next visit, we will see her in late May or June for yearly physical

## 2018-12-22 ENCOUNTER — APPOINTMENT (OUTPATIENT)
Dept: URGENT CARE | Age: 58
End: 2018-12-22
Payer: OTHER MISCELLANEOUS

## 2018-12-22 PROCEDURE — G0382 LEV 3 HOSP TYPE B ED VISIT: HCPCS | Performed by: PREVENTIVE MEDICINE

## 2018-12-22 PROCEDURE — 99283 EMERGENCY DEPT VISIT LOW MDM: CPT | Performed by: PREVENTIVE MEDICINE

## 2018-12-24 ENCOUNTER — APPOINTMENT (OUTPATIENT)
Dept: URGENT CARE | Age: 58
End: 2018-12-24
Payer: OTHER MISCELLANEOUS

## 2018-12-24 PROCEDURE — 99213 OFFICE O/P EST LOW 20 MIN: CPT | Performed by: PREVENTIVE MEDICINE

## 2018-12-28 ENCOUNTER — APPOINTMENT (OUTPATIENT)
Dept: URGENT CARE | Age: 58
End: 2018-12-28
Payer: OTHER MISCELLANEOUS

## 2018-12-28 PROCEDURE — 99213 OFFICE O/P EST LOW 20 MIN: CPT | Performed by: PREVENTIVE MEDICINE

## 2019-01-02 ENCOUNTER — APPOINTMENT (OUTPATIENT)
Dept: URGENT CARE | Age: 59
End: 2019-01-02
Payer: OTHER MISCELLANEOUS

## 2019-01-02 PROCEDURE — 99213 OFFICE O/P EST LOW 20 MIN: CPT | Performed by: PREVENTIVE MEDICINE

## 2019-01-17 ENCOUNTER — OFFICE VISIT (OUTPATIENT)
Dept: FAMILY MEDICINE CLINIC | Facility: CLINIC | Age: 59
End: 2019-01-17
Payer: COMMERCIAL

## 2019-01-17 VITALS
DIASTOLIC BLOOD PRESSURE: 76 MMHG | SYSTOLIC BLOOD PRESSURE: 138 MMHG | OXYGEN SATURATION: 97 % | HEIGHT: 61 IN | HEART RATE: 70 BPM | WEIGHT: 157.4 LBS | TEMPERATURE: 99.9 F | BODY MASS INDEX: 29.72 KG/M2

## 2019-01-17 DIAGNOSIS — I25.10 CORONARY ARTERIOSCLEROSIS: ICD-10-CM

## 2019-01-17 DIAGNOSIS — B34.9 ACUTE VIRAL SYNDROME: ICD-10-CM

## 2019-01-17 DIAGNOSIS — R55 SYNCOPE AND COLLAPSE: Primary | ICD-10-CM

## 2019-01-17 PROCEDURE — 93000 ELECTROCARDIOGRAM COMPLETE: CPT | Performed by: FAMILY MEDICINE

## 2019-01-17 PROCEDURE — 99214 OFFICE O/P EST MOD 30 MIN: CPT | Performed by: FAMILY MEDICINE

## 2019-01-17 PROCEDURE — 3008F BODY MASS INDEX DOCD: CPT | Performed by: FAMILY MEDICINE

## 2019-01-17 PROCEDURE — 1036F TOBACCO NON-USER: CPT | Performed by: FAMILY MEDICINE

## 2019-01-17 NOTE — LETTER
January 17, 2019     Patient: Ja Martinez   YOB: 1960   Date of Visit: 1/17/2019       To Whom it May Concern:    Ziyad De Los Santos is under my professional care  She was seen in my office on 1/17/2019  Please excuse Ziyad De Los Santos from work on days 1/17-1/18/19  If you have any questions or concerns, please don't hesitate to call           Sincerely,          Sarah Louise, DO

## 2019-01-17 NOTE — PROGRESS NOTES
FAMILY PRACTICE OFFICE VISIT  [facility]  Usha JIMENEZ O  [facility]  [address], [address]      NAME: Nisha Flores  AGE: 60 y o  SEX: female  : 1960   MRN: [id number]    DATE: 2019  TIME: 4:11 PM    Assessment and Plan     Problem List Items Addressed This Visit        Unprioritized    Coronary arteriosclerosis    Relevant Orders    POCT ECG (Completed)      Other Visit Diagnoses     Syncope and collapse    -  Primary    Relevant Orders    POCT ECG (Completed)    Acute viral syndrome              Patient Instructions   EKG run here today with no acute finding  Recent viral illness appears to be trigger with vasovagal event while standing at work, we did discuss emergency room workup/observation, as she is improved she wishes to hold off on this but will go if symptoms worsen  She declines Holter monitor  Consider redo echocardiogram, did have   With less than 50% carotids  hold off on redo for now  Continue on aspirin 81 mg, metoprolol 12 5 twice daily  Make sure to keep hydrated, she should also be out of work tomorrow, I would prefer she remain out of work for the weekend ( out -, should be out - but wishes to try and work as has no PTO  )    Re cold sx -   Fluids/ steam/ nasal saline  Has slip for CBC/BMP/ Lipid  Keep next appt here  Chief Complaint     Chief Complaint   Patient presents with    Loss of Consciousness       History of Present Illness   Nisha Flores is a 60y o -year-old female who was in her usual health until past few days viral respiratory symptoms  This morning she was at work, 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, noted lightheadedness, next thing she knew co-worker was shaking her, she was told she passed out and slumped over steering wheel  Has been using DayQuil  No head trauma other than bumping up her nose on steering wheel    She was taken to office, given water and crackers, observe then came around  She went home and is in today for evaluation  Now w congestion/ low gr temp, mild malaise  She has had no prior history of syncope  She had no chest pain, palpitations, shortness of breath associated with episode, no headache  No recent bowel changes or urinary symptoms    She had been in late November for routine check  Last echocardiogram 2015 essentially negative other than mild valvular findings, carotids less than 50% 2015  As in prior history status post 2007 anterior wall MI with PTCA/stent times to LAD  Review of Systems   Review of Systems   Constitutional: Positive for fatigue and fever  Negative for appetite change and unexpected weight change  HENT: Positive for congestion, postnasal drip and sore throat  Negative for ear pain, nosebleeds and trouble swallowing  Eyes: Negative for visual disturbance  Respiratory: Negative for cough (mild), chest tightness and shortness of breath  Cardiovascular: Negative for chest pain, palpitations and leg swelling  Gastrointestinal: Negative for abdominal pain, blood in stool, diarrhea, nausea and vomiting  No acid reflux     No change in bowel   Genitourinary: Negative for dysuria and hematuria  Musculoskeletal: Negative for back pain and neck pain  Skin: Negative for wound  Neurological: Positive for syncope  Negative for dizziness, speech difficulty and headaches  Hematological: Does not bruise/bleed easily  Psychiatric/Behavioral: Negative for behavioral problems and confusion         Active Problem List     Patient Active Problem List   Diagnosis    History of vertigo    Tinnitus    Heart murmur    Venous (peripheral) insufficiency    Coronary arteriosclerosis    Carotid stenosis    H/O heart artery stent    History of myocardial infarction    Carcinoid tumor of lung    Malignant neoplasm of right ovary (HCC)    S/P SEKOU-BSO       Past Medical History:  Past Medical History:   Diagnosis Date    Carcinoid tumor of lung 2009    Carcinoid lung neoplasm - stage 1 w resection 2010; last assessed - 17Jun2013    Ovarian cancer ([address] ) [id number]    Right stage 1; [id number]    Thrombophlebitis of arm, left     Thrombophlebitis left; last assessed - [id number]       Past Surgical History:  Past Surgical History:   Procedure Laterality Date    BREAST BIOPSY      Biopsy Breast Percutaneous needle core    CLOSED REDUCTION WRIST FRACTURE Left 07/20/2011    COLONOSCOPY  05/2009    Complete Colonoscopy    CORONARY ANGIOPLASTY WITH STENT PLACEMENT      LUNG LOBECTOMY      RLL w/early stage carcinoid tumor; Resolved - Approx [id number]    TOTAL ABDOMINAL HYSTERECTOMY W/ BILATERAL SALPINGOOPHORECTOMY         Family History:  Family History   Problem Relation Age of Onset   [facility] Breast cancer Mother     Diabetes Mother         Diabetes Mellitus       Social History:  Social History     Social History    Marital status:      Spouse name: N/A    Number of children: N/A    Years of education: N/A     Occupational History    Warehouse work - Full-Time      Social History Main Topics    Smoking status: Former Smoker    Smokeless tobacco: Never Used    Alcohol use Yes      Comment: minimum alcohol consumption    Drug use: Unknown    Sexual activity: Not on file     Other Topics Concern    Not on file     Social History Narrative    No narrative on file       Objective     Vitals:    01/17/19 1334   BP: 138/76   BP Location: Left arm   Patient Position: Sitting   Cuff Size: Large   Pulse: 70   Temp: 99 9 °F (37 7 °C)   SpO2: 97%   Weight: 71 4 kg (157 lb 6 4 oz)   Height: 5' 1" (1 549 m)     Body mass index is 29 74 kg/m²      BP Readings from Last 3 Encounters:   01/17/19 138/76   11/29/18 130/76   06/20/18 132/76       Wt Readings from Last 3 Encounters:   01/17/19 71 4 kg (157 lb 6 4 oz)   11/29/18 69 9 kg (154 lb 3 2 oz)   06/20/18 71 5 kg (157 lb 9 6 oz) Physical Exam   Constitutional: She is oriented to person, place, and time  She appears well-developed and well-nourished  No distress  Seated on table, temp 99 9°, oxygenating well, not orthostatic with sit to stand/toe touch  HENT:   Right Ear: External ear normal    Left Ear: External ear normal    Mouth/Throat: Oropharynx is clear and moist  No oropharyngeal exudate  TM clear   Mild tenderness upper nasal   Eyes: Pupils are equal, round, and reactive to light  Conjunctivae and EOM are normal  No scleral icterus  No nystagmus, vision grossly intact, has glasses   Neck: Normal range of motion  Neck supple  Cardiovascular: Normal rate, regular rhythm and normal heart sounds  No carotid bruit   Pulmonary/Chest: Effort normal and breath sounds normal  No respiratory distress  Abdominal: Soft  There is no tenderness  Musculoskeletal: She exhibits no edema  Lymphadenopathy:     She has no cervical adenopathy  Neurological: She is alert and oriented to person, place, and time  No cranial nerve deficit  She exhibits normal muscle tone  Coordination normal    3/3 short-term recall, able to spell world backwards, alert and oriented x3, neurologically intact, not orthostatic   Psychiatric: She has a normal mood and affect  Her behavior is normal        ALLERGIES:  Allergies   Allergen Reactions    Contrast  [Iodinated Diagnostic Agents] Hives    Iodine      Other reaction(s):  Other (See Comments)  Contrast dye    Niacin      Annotation - [id number]: felt poor w med       Current Medications     Current Outpatient Prescriptions   Medication Sig Dispense Refill    aspirin 81 MG tablet Take 1 tablet by mouth daily      Cholecalciferol (VITAMIN D3) 1000 units CAPS Take 1 capsule by mouth daily      metoprolol tartrate (LOPRESSOR) 25 mg tablet Take 0 5 tablets (12 5 mg total) by mouth 2 (two) times a day 90 tablet 3    Omega-3 Fatty Acids (FISH OIL) 1200 MG CAPS Take 2 capsules by mouth daily  simvastatin (ZOCOR) 80 mg tablet Take 1 tablet (80 mg total) by mouth daily 30 tablet 1     No current facility-administered medications for this visit                [facility] recent [facility] [address]   ( others may be available in [facility] / Media sections)  Lab Results   Component Value Date    WBC 3 37 (L) 06/01/2018    HGB 14 0 06/01/2018    HCT 41 9 06/01/2018     06/01/2018    CHOL 124 07/01/2015    TRIG 30 06/01/2018    HDL 62 (H) 06/01/2018    ALT 41 06/01/2018    AST 27 06/01/2018     07/01/2015    K 4 9 06/01/2018     06/01/2018    CREATININE 0 69 06/01/2018    BUN 13 06/01/2018    CO2 31 06/01/2018    GLUF 106 (H) 06/01/2018     Lab Results   Component Value Date    LDLCALC 86 06/01/2018     Lab Results   Component Value Date    [id number] 1 053 07/01/2015    [id number] 1 053 07/01/2015         Orders Placed This Encounter   Procedures    POCT ECG         Kaycee Salter DO

## 2019-01-17 NOTE — PATIENT INSTRUCTIONS
EKG run here today with no acute finding  Recent viral illness appears to be trigger with vasovagal event while standing at work, we did discuss emergency room workup/observation, as she is improved she wishes to hold off on this but will go if symptoms worsen  She declines Holter monitor  Consider redo echocardiogram, did have 2015  With less than 50% carotids 2015 hold off on redo for now  Continue on aspirin 81 mg, metoprolol 12 5 twice daily  Make sure to keep hydrated, she should also be out of work tomorrow, I would prefer she remain out of work for the weekend ( out 1/17-18, should be out 1/19-20 but wishes to try and work as has no PTO  )    Re cold sx -   Fluids/ steam/ nasal saline  Has slip for CBC/BMP/ Lipid  Keep next appt here

## 2019-06-11 ENCOUNTER — OFFICE VISIT (OUTPATIENT)
Dept: FAMILY MEDICINE CLINIC | Facility: CLINIC | Age: 59
End: 2019-06-11
Payer: COMMERCIAL

## 2019-06-11 VITALS
DIASTOLIC BLOOD PRESSURE: 72 MMHG | SYSTOLIC BLOOD PRESSURE: 126 MMHG | HEIGHT: 62 IN | BODY MASS INDEX: 27.57 KG/M2 | OXYGEN SATURATION: 98 % | WEIGHT: 149.8 LBS | HEART RATE: 61 BPM

## 2019-06-11 DIAGNOSIS — Z00.00 WELL ADULT HEALTH CHECK: Primary | ICD-10-CM

## 2019-06-11 DIAGNOSIS — C56.1 MALIGNANT NEOPLASM OF RIGHT OVARY (HCC): ICD-10-CM

## 2019-06-11 DIAGNOSIS — Z23 NEED FOR PNEUMOCOCCAL VACCINATION: ICD-10-CM

## 2019-06-11 DIAGNOSIS — I25.10 CORONARY ARTERIOSCLEROSIS: ICD-10-CM

## 2019-06-11 DIAGNOSIS — D3A.090 CARCINOID TUMOR OF LUNG: ICD-10-CM

## 2019-06-11 PROCEDURE — 99396 PREV VISIT EST AGE 40-64: CPT | Performed by: FAMILY MEDICINE

## 2019-06-11 PROCEDURE — 90732 PPSV23 VACC 2 YRS+ SUBQ/IM: CPT

## 2019-06-11 PROCEDURE — 90471 IMMUNIZATION ADMIN: CPT

## 2019-06-12 ENCOUNTER — APPOINTMENT (OUTPATIENT)
Dept: LAB | Facility: HOSPITAL | Age: 59
End: 2019-06-12
Payer: COMMERCIAL

## 2019-06-12 LAB
ALBUMIN SERPL BCP-MCNC: 3.7 G/DL (ref 3.5–5)
ALP SERPL-CCNC: 56 U/L (ref 46–116)
ALT SERPL W P-5'-P-CCNC: 32 U/L (ref 12–78)
ANION GAP SERPL CALCULATED.3IONS-SCNC: 10 MMOL/L (ref 4–13)
AST SERPL W P-5'-P-CCNC: 24 U/L (ref 5–45)
BILIRUB SERPL-MCNC: 1.12 MG/DL (ref 0.2–1)
BUN SERPL-MCNC: 20 MG/DL (ref 5–25)
CALCIUM SERPL-MCNC: 9.5 MG/DL (ref 8.3–10.1)
CHLORIDE SERPL-SCNC: 108 MMOL/L (ref 100–108)
CHOLEST SERPL-MCNC: 138 MG/DL (ref 50–200)
CO2 SERPL-SCNC: 27 MMOL/L (ref 21–32)
CREAT SERPL-MCNC: 0.73 MG/DL (ref 0.6–1.3)
ERYTHROCYTE [DISTWIDTH] IN BLOOD BY AUTOMATED COUNT: 12.8 % (ref 11.6–15.1)
GFR SERPL CREATININE-BSD FRML MDRD: 90 ML/MIN/1.73SQ M
GLUCOSE P FAST SERPL-MCNC: 97 MG/DL (ref 65–99)
HCT VFR BLD AUTO: 42.1 % (ref 34.8–46.1)
HDLC SERPL-MCNC: 65 MG/DL (ref 40–60)
HGB BLD-MCNC: 13.4 G/DL (ref 11.5–15.4)
LDLC SERPL CALC-MCNC: 68 MG/DL (ref 0–100)
MCH RBC QN AUTO: 29.1 PG (ref 26.8–34.3)
MCHC RBC AUTO-ENTMCNC: 31.8 G/DL (ref 31.4–37.4)
MCV RBC AUTO: 92 FL (ref 82–98)
NONHDLC SERPL-MCNC: 73 MG/DL
PLATELET # BLD AUTO: 192 THOUSANDS/UL (ref 149–390)
PMV BLD AUTO: 10.1 FL (ref 8.9–12.7)
POTASSIUM SERPL-SCNC: 4.8 MMOL/L (ref 3.5–5.3)
PROT SERPL-MCNC: 7.4 G/DL (ref 6.4–8.2)
RBC # BLD AUTO: 4.6 MILLION/UL (ref 3.81–5.12)
SODIUM SERPL-SCNC: 145 MMOL/L (ref 136–145)
TRIGL SERPL-MCNC: 25 MG/DL
WBC # BLD AUTO: 3.42 THOUSAND/UL (ref 4.31–10.16)

## 2019-06-12 PROCEDURE — 36415 COLL VENOUS BLD VENIPUNCTURE: CPT | Performed by: FAMILY MEDICINE

## 2019-06-12 PROCEDURE — 80061 LIPID PANEL: CPT | Performed by: FAMILY MEDICINE

## 2019-06-12 PROCEDURE — 80053 COMPREHEN METABOLIC PANEL: CPT | Performed by: FAMILY MEDICINE

## 2019-06-12 PROCEDURE — 85027 COMPLETE CBC AUTOMATED: CPT | Performed by: FAMILY MEDICINE

## 2019-07-02 DIAGNOSIS — I25.10 CORONARY ARTERIOSCLEROSIS: ICD-10-CM

## 2019-07-02 RX ORDER — SIMVASTATIN 80 MG
80 TABLET ORAL DAILY
Qty: 90 TABLET | Refills: 3 | Status: SHIPPED | OUTPATIENT
Start: 2019-07-02 | End: 2020-07-08 | Stop reason: SDUPTHER

## 2019-07-19 ENCOUNTER — HOSPITAL ENCOUNTER (OUTPATIENT)
Dept: MAMMOGRAPHY | Facility: MEDICAL CENTER | Age: 59
Discharge: HOME | End: 2019-07-19
Payer: COMMERCIAL

## 2019-07-19 VITALS — WEIGHT: 149 LBS | BODY MASS INDEX: 28.13 KG/M2 | HEIGHT: 61 IN

## 2019-07-19 DIAGNOSIS — Z12.31 SCREENING MAMMOGRAM, ENCOUNTER FOR: ICD-10-CM

## 2019-07-19 PROCEDURE — 77067 SCR MAMMO BI INCL CAD: CPT

## 2020-01-28 ENCOUNTER — OFFICE VISIT (OUTPATIENT)
Dept: FAMILY MEDICINE CLINIC | Facility: CLINIC | Age: 60
End: 2020-01-28
Payer: COMMERCIAL

## 2020-01-28 VITALS
DIASTOLIC BLOOD PRESSURE: 70 MMHG | HEART RATE: 66 BPM | WEIGHT: 149 LBS | BODY MASS INDEX: 27.42 KG/M2 | SYSTOLIC BLOOD PRESSURE: 136 MMHG | TEMPERATURE: 98.1 F | OXYGEN SATURATION: 97 % | HEIGHT: 62 IN

## 2020-01-28 DIAGNOSIS — H61.21 IMPACTED CERUMEN OF RIGHT EAR: Primary | ICD-10-CM

## 2020-01-28 DIAGNOSIS — Z23 NEEDS FLU SHOT: ICD-10-CM

## 2020-01-28 PROCEDURE — 90682 RIV4 VACC RECOMBINANT DNA IM: CPT

## 2020-01-28 PROCEDURE — 69210 REMOVE IMPACTED EAR WAX UNI: CPT | Performed by: FAMILY MEDICINE

## 2020-01-28 PROCEDURE — 99213 OFFICE O/P EST LOW 20 MIN: CPT | Performed by: FAMILY MEDICINE

## 2020-01-28 PROCEDURE — 90471 IMMUNIZATION ADMIN: CPT

## 2020-01-28 PROCEDURE — 1036F TOBACCO NON-USER: CPT | Performed by: FAMILY MEDICINE

## 2020-01-28 PROCEDURE — 3008F BODY MASS INDEX DOCD: CPT | Performed by: FAMILY MEDICINE

## 2020-01-28 RX ORDER — MULTIVITAMIN WITH IRON
1 TABLET ORAL
COMMUNITY

## 2020-01-28 NOTE — PROGRESS NOTES
Assessment/Plan:    No problem-specific Assessment & Plan notes found for this encounter  Diagnoses and all orders for this visit:    Impacted cerumen of right ear    Needs flu shot  -     influenza vaccine, [id number], quadrivalent, recombinant, PF, 0 5 mL, for patients 18 yr+ (FLUBLOK)    Other orders  -     Magnesium 250 MG TABS; Take 1 tablet by mouth          Subjective:      Patient ID: Yina Antoine is a 60 y o  female  She is here today with complaint of ringing in her right ear which has been going on for about 4 months  It started when she got water in her ear  Even after she got the water out, she felt like it was full and the ringing was intermittent  She notes some very mild discomfort if she pushes on the ear sometimes  She denies hearing loss on that side  The ringing is usually low level, but she had a loud noise like a cymbal in the ear the other day which caused concern  She denies seasonal allergies  She denies chronic nasal congestion, but she works in refrigeration and notes some runny nose with temperature changes  She tried ear drops without success  She does try to press on the outer ear sometimes which relieves the ringing temporarily  The following portions of the patient's history were reviewed and updated as appropriate: allergies, current medications, past family history, past medical history, past social history, past surgical history and problem list     Review of Systems   Constitutional: Positive for fever  Negative for activity change and chills  HENT: Positive for rhinorrhea  Negative for congestion  Respiratory: Negative for cough  Cardiovascular: Negative for chest pain           Objective:      /70 (BP Location: Left arm, Patient Position: Sitting, Cuff Size: Standard)   Pulse 66   Temp 98 1 °F (36 7 °C) (Tympanic)   Ht 5' 2" (1 575 m)   Wt 67 6 kg (149 lb)   SpO2 97%   BMI 27 25 kg/m²          Physical Exam   Constitutional: She appears well-developed and well-nourished  HENT:   Head: Atraumatic  Left Ear: External ear normal    Nose: Nose normal    Mouth/Throat: Oropharynx is clear and moist    Right TM obscured by cerumen  Could you about 50% of the eardrum after lavage and curette  Cardiovascular: Normal rate and regular rhythm  Pulmonary/Chest: Effort normal and breath sounds normal    Nursing note and vitals reviewed  Ear cerumen removal  Date/Time: 1/28/2020 12:41 PM  Performed by: Uli Kenny MD  Authorized by: Uli Kenny MD     Patient location:  Clinic  Consent:     Consent obtained:  Verbal    Consent given by:  Patient    Risks discussed:  Bleeding, incomplete removal and dizziness    Alternatives discussed:  No treatment  Universal protocol:     Procedure explained and questions answered to patient or proxy's satisfaction: yes    Procedure details:     Location:  R ear    Procedure type: irrigation with instrumentation      Approach:  External  Post-procedure details:     Complication:  None    Hearing quality:  Improved    Patient tolerance of procedure:   Tolerated well, no immediate complications

## 2020-07-08 DIAGNOSIS — I25.10 CORONARY ARTERIOSCLEROSIS: ICD-10-CM

## 2020-07-08 RX ORDER — SIMVASTATIN 80 MG
80 TABLET ORAL DAILY
Qty: 90 TABLET | Refills: 3 | Status: SHIPPED | OUTPATIENT
Start: 2020-07-08 | End: 2020-08-10

## 2020-07-08 NOTE — TELEPHONE ENCOUNTER
Call , let her know I sent in prescriptions, but she should set up a regular physical w me in the next month

## 2020-07-14 ENCOUNTER — TELEPHONE (OUTPATIENT)
Dept: FAMILY MEDICINE CLINIC | Facility: CLINIC | Age: 60
End: 2020-07-14

## 2020-07-30 ENCOUNTER — OFFICE VISIT (OUTPATIENT)
Dept: FAMILY MEDICINE CLINIC | Facility: CLINIC | Age: 60
End: 2020-07-30
Payer: COMMERCIAL

## 2020-07-30 VITALS
SYSTOLIC BLOOD PRESSURE: 128 MMHG | TEMPERATURE: 98 F | DIASTOLIC BLOOD PRESSURE: 72 MMHG | HEIGHT: 62 IN | OXYGEN SATURATION: 96 % | BODY MASS INDEX: 28.89 KG/M2 | HEART RATE: 52 BPM | WEIGHT: 157 LBS

## 2020-07-30 DIAGNOSIS — I25.2 HISTORY OF MYOCARDIAL INFARCTION: ICD-10-CM

## 2020-07-30 DIAGNOSIS — Z12.11 COLON CANCER SCREENING: ICD-10-CM

## 2020-07-30 DIAGNOSIS — C56.1 MALIGNANT NEOPLASM OF RIGHT OVARY (HCC): ICD-10-CM

## 2020-07-30 DIAGNOSIS — Z00.00 WELL ADULT HEALTH CHECK: Primary | ICD-10-CM

## 2020-07-30 DIAGNOSIS — D3A.090 CARCINOID TUMOR OF LUNG, UNSPECIFIED WHETHER MALIGNANT: ICD-10-CM

## 2020-07-30 DIAGNOSIS — Z12.39 BREAST CANCER SCREENING: ICD-10-CM

## 2020-07-30 DIAGNOSIS — Z87.891 FORMER SMOKER: ICD-10-CM

## 2020-07-30 DIAGNOSIS — Z95.5 H/O HEART ARTERY STENT: ICD-10-CM

## 2020-07-30 DIAGNOSIS — I25.10 CORONARY ARTERIOSCLEROSIS: ICD-10-CM

## 2020-07-30 PROCEDURE — 99396 PREV VISIT EST AGE 40-64: CPT | Performed by: FAMILY MEDICINE

## 2020-07-30 NOTE — PROGRESS NOTES
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, s/p SEKOU/ BSO        Respiratory    Carcinoid tumor of lung w resection     Relevant Orders    CT chest wo contrast       Cardiovascular and Mediastinum    Coronary arteriosclerosis    Relevant Orders    Ambulatory referral to Cardiology    CBC    Comprehensive metabolic panel    Lipid panel       Other    H/O heart artery stent    Relevant Orders    Ambulatory referral to Cardiology    History of myocardial infarction    Former smoker -  Quit     Relevant Orders    CT chest wo contrast      Other Visit Diagnoses     Well adult health check    -  Primary    Breast cancer screening        Relevant Orders    Mammo screening bilateral w 3d & cad    Colon cancer screening - declined              Patient Instructions     She is doing well here today, blood pressure excellent after sitting, heart rate 52- she did have history MI with stent placement , she had held off on seeing Cardiology through the years, last testing was echocardiogram in  which showed ejection fraction 55%, mild MR  She remains very active with work, no chest pain or increased shortness of breath but it would be best for her to be under the care of cardiologist, she will establish care with [facility]  Defer testing to them    She continues on aspirin 81 mg daily, simvastatin 80 mg daily, metoprolol tartrate 25 mg 1/2 tablet twice daily  Remote history syncope, no issues past few years  She has no increased swelling left lower extremity, uses compression stocking  We did review previous blood work, last year CBC, CMP unremarkable, slip given to redo fasting blood work  She is up to date with Lipid screening  Await redo, cholesterol last year 138 with HDL 65, LDL 68  She continues on simvastatin 80 mg daily  She is up to date with Diabetes screening  Last TSH 2015 was okay  Immunization History   Administered Date(s) Administered    Influenza Quadrivalent, 6-35 Months IM 11/04/2016    Influenza TIV (IM) 12/10/2012    Influenza, recombinant, quadrivalent,injectable, preservative free 01/28/2020    Pneumococcal Polysaccharide PPV23 06/11/2019     She does do yearly Flu shot  Tdap/tetanus shot was declined, will be done at a future date  (done every 10 yrs for superficial cuts, every 5 yrs for deep wounds)   Can also look into coverage for new shingles shot, Shingrix  Can do that at pharmacy  - s/p resection 2010 early stage carcinoid    former smoker-  over 20 pack yr -  quit 2007 -  Can redo CT Chest      Regarding Colon Cancer screening, we discussed Colonoscopy vs ColoGuard is indicated starting at age 36-53  Patient declines  Did have colonoscopy back in 2009    She does not see her Gynecologist routinely  She had [facility] BSO for stage I ovarian cancer back in 2009  Discussed screening Mammogram, this is up-to-date,   Her mammogram was okay July 2019  [facility] Hepatitis C Screening indicated for 'baby boomers' -   previously perfomed and was negative  Continue to try to watch healthy diet, exercise routinely  We will see her again in 12 months, sooner as needed           Chief Complaint     Chief Complaint   Patient presents with    Physical Exam       History of Present Illness   Brent Sood is a 60y o -year-old female who is in today for a yearly check, she relates she has been feeling well, she has been working in cold unit routinely without chest pain, shortness of breath  We have discussed seeing Cardiology at multiple visits in the past, she is now agreeable to seeing Cardiology in follow-up regarding remote history of stents/mi  She is using all medication as directed  Review of Systems   Review of Systems   Constitutional: Negative for appetite change, fatigue, fever and unexpected weight change  HENT: Negative for sore throat and trouble swallowing  Respiratory: Negative for cough, chest tightness and shortness of breath  Cardiovascular: Negative for chest pain, palpitations and leg swelling  Gastrointestinal: Negative for abdominal pain, blood in stool, nausea and vomiting  No acid reflux     No change in bowel   Genitourinary: Negative for dysuria and hematuria  Neurological: Negative for dizziness, syncope, light-headedness and headaches  Psychiatric/Behavioral: Negative for behavioral problems and confusion  Active Problem List     Patient Active Problem List   Diagnosis    History of vertigo    Tinnitus    Heart murmur    Venous (peripheral) insufficiency    Coronary arteriosclerosis    Carotid stenosis    H/O heart artery stent    History of myocardial infarction    Carcinoid tumor of lung w resection 2010    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, s/p SEKOU/ BSO     S/P SEKOU-BSO    Former smoker -  Quit 2007       Past Medical History:  Reviewed    Past Surgical History:  Reviewed    Family History:  Reviewed    Social History:  Reviewed    Objective     Vitals:    07/30/20 0927   BP: 128/72   BP Location: Left arm   Patient Position: Sitting   Cuff Size: Standard   Pulse: (!) 52   Temp: 98 °F (36 7 °C)   SpO2: 96%   Weight: 71 2 kg (157 lb)   Height: 5' 2" (1 575 m)     Body mass index is 28 72 kg/m²      BP Readings from Last 3 Encounters:   07/30/20 128/72 01/28/20 136/70   06/11/19 126/72       Wt Readings from Last 3 Encounters:   07/30/20 71 2 kg (157 lb)   01/28/20 67 6 kg (149 lb)   07/19/19 67 6 kg (149 lb)       Physical Exam   Constitutional: She is oriented to person, place, and time  She appears well-developed and well-nourished  No distress  HENT:   TM clear   Eyes: Conjunctivae are normal  No scleral icterus  Cardiovascular: Normal rate, regular rhythm and normal heart sounds  No murmur heard  No carotid bruit   Pulmonary/Chest: Effort normal and breath sounds normal  No respiratory distress  Abdominal: Soft  There is no tenderness  Musculoskeletal: She exhibits no edema (Chronic trace edema left lower extremity)  Lymphadenopathy:     She has no cervical adenopathy  Neurological: She is alert and oriented to person, place, and time  Psychiatric: She has a normal mood and affect  Her behavior is normal        ALLERGIES:  Allergies   Allergen Reactions    Contrast  [Iodinated Diagnostic Agents] Hives    Iodine      Other reaction(s): Other (See Comments)  Contrast dye    Niacin      [facility] - [id number]: felt poor w med       Current Medications     Current Outpatient Medications   Medication Sig Dispense Refill    aspirin 81 MG tablet Take 1 tablet by mouth daily      Cholecalciferol (VITAMIN D3) 1000 units CAPS Take 1 capsule by mouth daily      Magnesium 250 MG TABS Take 1 tablet by mouth      metoprolol tartrate (LOPRESSOR) 25 mg tablet Take 0 5 tablets (12 5 mg total) by mouth 2 (two) times a day (Patient taking differently: Take 12 5 mg by mouth daily ) 90 tablet 3    Omega-3 Fatty Acids (FISH OIL) 1200 MG CAPS Take 2 capsules by mouth daily        simvastatin (ZOCOR) 80 mg tablet Take 1 tablet (80 mg total) by mouth daily 90 tablet 3     No current facility-administered medications for this visit               Orders Placed This Encounter   Procedures    Mammo screening bilateral w 3d & cad    CT chest wo contrast    CBC    Comprehensive metabolic panel    Lipid panel    Ambulatory referral to Cardiology         Tio Richardson,

## 2020-07-30 NOTE — PATIENT INSTRUCTIONS
She is doing well here today, blood pressure excellent after sitting, heart rate 52- she did have history MI with stent placement 2007, she had held off on seeing Cardiology through the years, last testing was echocardiogram in 2015 which showed ejection fraction 55%, mild MR  She remains very active with work, no chest pain or increased shortness of breath but it would be best for her to be under the care of cardiologist, she will establish care with [facility]  Defer testing to them  She continues on aspirin 81 mg daily, simvastatin 80 mg daily, metoprolol tartrate 25 mg 1/2 tablet twice daily  Remote history syncope, no issues past few years  She has no increased swelling left lower extremity, uses compression stocking  We did review previous blood work, last year CBC, CMP unremarkable, slip given to redo fasting blood work  She is up to date with Lipid screening  Await redo, cholesterol last year 138 with HDL 65, LDL 68  She continues on simvastatin 80 mg daily  She is up to date with Diabetes screening  Last TSH 2015 was okay  Immunization History   Administered Date(s) Administered    Influenza Quadrivalent, 6-35 Months IM 11/04/2016    Influenza TIV (IM) 12/10/2012    Influenza, recombinant, quadrivalent,injectable, preservative free 01/28/2020    Pneumococcal Polysaccharide PPV23 06/11/2019     She does do yearly Flu shot  Tdap/tetanus shot was declined, will be done at a future date  (done every 10 yrs for superficial cuts, every 5 yrs for deep wounds)   Can also look into coverage for new shingles shot, Shingrix  Can do that at pharmacy  - s/p resection 2010 early stage carcinoid    former smoker-  over 20 pack yr -  quit 2007 -  Can redo CT Chest      Regarding Colon Cancer screening, we discussed Colonoscopy vs ColoGuard is indicated starting at age 36-53  Patient declines  Did have colonoscopy back in 2009    She does not see her Gynecologist routinely    She had SEKOU BSO for stage I ovarian cancer back in 2009  Discussed screening Mammogram, this is up-to-date,   Her mammogram was okay July 2019  [facility] Hepatitis C Screening indicated for 'baby boomers' -   previously perfomed and was negative  Continue to try to watch healthy diet, exercise routinely  We will see her again in 12 months, sooner as needed

## 2020-07-31 ENCOUNTER — APPOINTMENT (OUTPATIENT)
Dept: LAB | Facility: HOSPITAL | Age: 60
End: 2020-07-31
Payer: COMMERCIAL

## 2020-07-31 LAB
ALBUMIN SERPL BCP-MCNC: 3.5 G/DL (ref 3.5–5)
ALP SERPL-CCNC: 47 U/L (ref 46–116)
ALT SERPL W P-5'-P-CCNC: 35 U/L (ref 12–78)
ANION GAP SERPL CALCULATED.3IONS-SCNC: 8 MMOL/L (ref 4–13)
AST SERPL W P-5'-P-CCNC: 23 U/L (ref 5–45)
BILIRUB SERPL-MCNC: 1.23 MG/DL (ref 0.2–1)
BUN SERPL-MCNC: 22 MG/DL (ref 5–25)
CALCIUM SERPL-MCNC: 8.5 MG/DL (ref 8.3–10.1)
CHLORIDE SERPL-SCNC: 108 MMOL/L (ref 100–108)
CHOLEST SERPL-MCNC: 137 MG/DL (ref 50–200)
CO2 SERPL-SCNC: 26 MMOL/L (ref 21–32)
CREAT SERPL-MCNC: 0.66 MG/DL (ref 0.6–1.3)
ERYTHROCYTE [DISTWIDTH] IN BLOOD BY AUTOMATED COUNT: 12.4 % (ref 11.6–15.1)
GFR SERPL CREATININE-BSD FRML MDRD: 96 ML/MIN/1.73SQ M
GLUCOSE P FAST SERPL-MCNC: 94 MG/DL (ref 65–99)
HCT VFR BLD AUTO: 40.2 % (ref 34.8–46.1)
HDLC SERPL-MCNC: 54 MG/DL
HGB BLD-MCNC: 13.1 G/DL (ref 11.5–15.4)
LDLC SERPL CALC-MCNC: 72 MG/DL (ref 0–100)
MCH RBC QN AUTO: 29.9 PG (ref 26.8–34.3)
MCHC RBC AUTO-ENTMCNC: 32.6 G/DL (ref 31.4–37.4)
MCV RBC AUTO: 92 FL (ref 82–98)
NONHDLC SERPL-MCNC: 83 MG/DL
PLATELET # BLD AUTO: 197 THOUSANDS/UL (ref 149–390)
PMV BLD AUTO: 10.4 FL (ref 8.9–12.7)
POTASSIUM SERPL-SCNC: 4.4 MMOL/L (ref 3.5–5.3)
PROT SERPL-MCNC: 7 G/DL (ref 6.4–8.2)
RBC # BLD AUTO: 4.38 MILLION/UL (ref 3.81–5.12)
SODIUM SERPL-SCNC: 142 MMOL/L (ref 136–145)
TRIGL SERPL-MCNC: 57 MG/DL
WBC # BLD AUTO: 3.79 THOUSAND/UL (ref 4.31–10.16)

## 2020-07-31 PROCEDURE — 85027 COMPLETE CBC AUTOMATED: CPT | Performed by: FAMILY MEDICINE

## 2020-07-31 PROCEDURE — 80061 LIPID PANEL: CPT | Performed by: FAMILY MEDICINE

## 2020-07-31 PROCEDURE — 80053 COMPREHEN METABOLIC PANEL: CPT | Performed by: FAMILY MEDICINE

## 2020-07-31 PROCEDURE — 36415 COLL VENOUS BLD VENIPUNCTURE: CPT | Performed by: FAMILY MEDICINE

## 2020-08-10 ENCOUNTER — CONSULT (OUTPATIENT)
Dept: CARDIOLOGY CLINIC | Facility: CLINIC | Age: 60
End: 2020-08-10
Payer: COMMERCIAL

## 2020-08-10 VITALS
BODY MASS INDEX: 29.08 KG/M2 | TEMPERATURE: 98.4 F | WEIGHT: 158 LBS | DIASTOLIC BLOOD PRESSURE: 62 MMHG | HEIGHT: 62 IN | SYSTOLIC BLOOD PRESSURE: 120 MMHG | HEART RATE: 52 BPM

## 2020-08-10 DIAGNOSIS — I25.10 CORONARY ARTERIOSCLEROSIS: ICD-10-CM

## 2020-08-10 DIAGNOSIS — R01.1 HEART MURMUR: Primary | ICD-10-CM

## 2020-08-10 DIAGNOSIS — Z95.5 H/O HEART ARTERY STENT: ICD-10-CM

## 2020-08-10 DIAGNOSIS — I25.2 HISTORY OF MYOCARDIAL INFARCTION: ICD-10-CM

## 2020-08-10 PROCEDURE — 3074F SYST BP LT 130 MM HG: CPT

## 2020-08-10 PROCEDURE — 1036F TOBACCO NON-USER: CPT

## 2020-08-10 PROCEDURE — 3008F BODY MASS INDEX DOCD: CPT

## 2020-08-10 PROCEDURE — 99204 OFFICE O/P NEW MOD 45 MIN: CPT

## 2020-08-10 PROCEDURE — 3078F DIAST BP <80 MM HG: CPT

## 2020-08-10 PROCEDURE — 93000 ELECTROCARDIOGRAM COMPLETE: CPT

## 2020-08-10 RX ORDER — ATORVASTATIN CALCIUM 40 MG/1
40 TABLET, FILM COATED ORAL DAILY
Qty: 90 TABLET | Refills: 3 | Status: SHIPPED | OUTPATIENT
Start: 2020-08-10 | End: 2021-01-20 | Stop reason: SDUPTHER

## 2020-08-10 NOTE — PROGRESS NOTES
Cardiology Consultation   Brown Bale, MD Radene Sandifer, MD Lus Mustache, DO, [facility] MD Miguel Angel Murrell DO, Alesia Laurent DO, [facility] [place]  -------------------------------------------------------------------  [facility]  [address] [place], [place] [address]  [phone]  [phone]  [phone]  08/10/20  Prelsey Borja  YOB: 1960   MRN: [id number]      Referring Physian: Luann Mata DO  [address]  [address]  [place], [place] [address]     HPI:  I am seeing this patient in cardiology consultation for:  Coronary artery disease    Presley Borja is a 60 y o  female with coronary artery disease status post PCI to the LAD and circumflex arteries 2007, 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, dyslipidemia, former smoker, quit in 2007, mild MR and TR on echocardiogram with ejection fraction 55% 2015 who presents today for initial cardiac evaluation  She was lost to follow-up with Cardiology over the years since her stents were placed  She is on aspirin, simvastatin 80 mg, metoprolol tartrate 12 5 mg b i d  Currently  She states she feels well, denies any chest pain shortness of breath dyspnea on exertion syncope presyncope paroxysmal nocturnal dyspnea or diaphoresis  Review of Systems   Constitutional: Negative for chills and fever  HENT: Negative for facial swelling and sore throat  Eyes: Negative for visual disturbance  Respiratory: Negative for cough, chest tightness, shortness of breath and wheezing  Cardiovascular: Negative for chest pain, palpitations and leg swelling  Gastrointestinal: Negative for abdominal pain, blood in stool, constipation, diarrhea, nausea and vomiting  Endocrine: Negative for cold intolerance and heat intolerance  Genitourinary: Negative for decreased urine volume, difficulty urinating, dysuria and hematuria  Musculoskeletal: Negative for arthralgias, back pain and myalgias     Skin: Negative for rash    Neurological: Negative for dizziness, syncope, weakness and numbness  Psychiatric/Behavioral: Negative for agitation, behavioral problems and confusion  The patient is not nervous/anxious  OBJECTIVE  Vitals:    08/10/20 1504   BP: 120/62   Pulse: (!) 52   Temp: 98 4 °F (36 9 °C)       Physical Exam   General appearance: alert and oriented, in no acute distress  Head: Normocephalic, without obvious abnormality, atraumatic  Eyes: conjunctivae/corneas clear  Anicteric  Neck: no adenopathy, no carotid bruit, no JVD, supple, symmetrical, trachea midline and thyroid not enlarged, no tenderness  Lungs: clear to auscultation bilaterally  Heart: regular rate and rhythm, S1, S2 normal, no murmur, no click, rub or gallop  Abdomen: soft, non-tender; bowel sounds normal; no masses,  no organomegaly  Extremities: extremities normal, warm and well-perfused; no cyanosis, clubbing, or edema  Skin: Skin color, texture, turgor normal  No rashes or lesions     EKG:  No results found for this visit on 08/10/20  IMPRESSION:  1  Coronary artery disease status post PCI with drug-eluting stents to the LAD and circumflex in 2007 in the setting of myocardial infarction  Sounds like this was a non ST segment myocardial infarction  2  Last ejection fraction of 55% with normal regional wall motion in 15'  3  Mild MR, mild TR  4  Dyslipidemia  5  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, resolved status post resection    DISCUSSION/RECOMMENDATIONS:   At this time she is doing well  She denies any angina symptoms at all   Recent lipid panel was reviewed   She is on simvastatin 80 mg, would discontinue this dose and start atorvastatin 40 mg instead  80 mg no longer recommended for simvastatin   She has no anginal symptoms   She had mild MR mild TR with preserved ejection fraction on echo 5 years ago  Per guideline criteria, with mild valve disease should re-evaluate every 3-5 years    Will schedule new echocardiogram at this time    She has no symptoms are concerning for worsening CAD  Would hold off on stress testing   She will follow-up with me in about 6 months  If echo in symptoms are stable at that time she may only need to follow yearly    Jack Mix DO, [facility] [place]  --------------------------------------------------------------------------------  TREADMILL STRESS  No results found for this or any previous visit    ----------------------------------------------------------------------------------------------  NUCLEAR STRESS TEST: No results found for this or any previous visit  No results found for this or any previous visit     --------------------------------------------------------------------------------  CATH:  No results found for this or any previous visit   --------------------------------------------------------------------------------  ECHO:   No results found for this or any previous visit  No results found for this or any previous visit   --------------------------------------------------------------------------------  HOLTER  No results found for this or any previous visit   --------------------------------------------------------------------------------  CAROTIDS  No results found for this or any previous visit  Diagnoses and all orders for this visit:    Heart murmur  -     Echo complete with contrast if indicated; Future    Coronary arteriosclerosis  -     Ambulatory referral to Cardiology  -     POCT ECG  -     atorvastatin (LIPITOR) 40 mg tablet; Take 1 tablet (40 mg total) by mouth daily    H/O heart artery stent  -     Ambulatory referral to Cardiology  -     POCT ECG    History of myocardial infarction  -     Echo complete with contrast if indicated; Future  -     atorvastatin (LIPITOR) 40 mg tablet;  Take 1 tablet (40 mg total) by mouth daily       ======================================================    Past Medical History:   Diagnosis Date    Carcinoid tumor of lung 2009    Carcinoid lung neoplasm - stage 1 w resection 2010; last assessed - 17Jun2013    Ovarian cancer ([facility] [address] ) [id number]    Right stage 1; [id number]    Thrombophlebitis of arm, left     Thrombophlebitis left; last assessed - [id number]     Past Surgical History:   Procedure Laterality Date    BREAST BIOPSY Right     Biopsy Breast Percutaneous needle core    BREAST BIOPSY Left     2002    CLOSED REDUCTION WRIST FRACTURE Left 07/20/2011    COLONOSCOPY  05/2009    Complete Colonoscopy    CORONARY ANGIOPLASTY WITH STENT PLACEMENT      HYSTERECTOMY      age 51    LUNG LOBECTOMY      RLL w/early stage carcinoid tumor; Resolved - Approx [id number]    TOTAL ABDOMINAL HYSTERECTOMY W/ BILATERAL SALPINGOOPHORECTOMY           Medications  Current Outpatient Medications   Medication Sig Dispense Refill    aspirin 81 MG tablet Take 1 tablet by mouth daily      Cholecalciferol (VITAMIN D3) 1000 units CAPS Take 1 capsule by mouth daily      Magnesium 250 MG TABS Take 1 tablet by mouth      metoprolol tartrate (LOPRESSOR) 25 mg tablet Take 0 5 tablets (12 5 mg total) by mouth 2 (two) times a day (Patient taking differently: Take 12 5 mg by mouth daily ) 90 tablet 3    Omega-3 Fatty Acids (FISH OIL) 1200 MG CAPS Take 2 capsules by mouth daily        atorvastatin (LIPITOR) 40 mg tablet Take 1 tablet (40 mg total) by mouth daily 90 tablet 3     No current facility-administered medications for this visit  Allergies   Allergen Reactions    Contrast  [Iodinated Diagnostic Agents] Hives    Iodine      Other reaction(s):  Other (See Comments)  Contrast dye    Niacin      Annotation - [id number]: felt poor w med       Social History     Socioeconomic History    Marital status:      Spouse name: Not on file    Number of children: Not on file    Years of education: Not on file    Highest education level: Not on file   Occupational History    Occupation: [employer] work - Full-Time   Social Needs    Financial resource strain: Not on file    Food insecurity     Worry: Not on file     Inability: Not on file    Transportation needs     Medical: Not on file     Non-medical: Not on file   Tobacco Use    Smoking status: Former Smoker     Last attempt to quit: 2007     Years since quittin 6    Smokeless tobacco: Never Used   Substance and Sexual Activity    Alcohol use: Yes     Frequency: Monthly or less     Comment: minimum alcohol consumption    Drug use: Never    Sexual activity: Not Currently   Lifestyle    Physical activity     Days per week: Not on file     Minutes per session: Not on file    Stress: Not on file   Relationships    Social connections     Talks on phone: Not on file     Gets together: Not on file     Attends Church service: Not on file     Active member of club or organization: Not on file     Attends meetings of clubs or organizations: Not on file     Relationship status: Not on file    Intimate partner violence     Fear of current or ex partner: Not on file     Emotionally abused: Not on file     Physically abused: Not on file     Forced sexual activity: Not on file   Other Topics Concern    Not on file   Social History Narrative    Not on file        Family History   Problem Relation Age of Onset    Breast cancer Mother 76    Diabetes Mother         Diabetes Mellitus    No Known Problems Sister     No Known Problems Daughter     Breast cancer Sister 48    No Known Problems Sister     No Known Problems Sister        Lab Results   Component Value Date    WBC 3 79 (L) 2020    HGB 13 1 2020    HCT 40 2 2020    MCV 92 2020     2020      Lab Results   Component Value Date    SODIUM 142 2020    K 4 4 2020     2020    CO2 26 2020    BUN 22 2020    CREATININE 0 66 2020    GLUC 104 2016    CALCIUM 8 5 2020      No results found for: HGBA1C   Lab Results   Component Value Date    CHOL 124 2015    CHOL 117 02/12/2014     Lab Results   Component Value Date    HDL 54 07/31/2020    HDL 65 (H) 06/12/2019    HDL 62 (H) 06/01/2018     Lab Results   Component Value Date    LDLCALC 72 07/31/2020    [address] 68 06/12/2019    LDLCALC 86 06/01/2018     Lab Results   Component Value Date    TRIG 57 07/31/2020    TRIG 25 06/12/2019    TRIG 30 06/01/2018     No results found for: CHOLHDL   No results found for: INR, PROTIME       Patient Active Problem List    Diagnosis Date Noted    Former smoker -  Quit 2007 07/30/2020    H/O heart artery stent 05/12/2018    History of myocardial infarction 05/12/2018    Carcinoid tumor of lung w resection 2010 05/12/2018    S/P SEKOU-BSO 05/12/2018    Carotid stenosis 07/23/2015    History of vertigo 05/27/2015    Tinnitus 05/27/2015    Heart murmur 05/27/2015    Venous (peripheral) insufficiency 01/07/2014    Coronary arteriosclerosis 05/20/2012    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

## 2020-11-24 ENCOUNTER — HOSPITAL ENCOUNTER (OUTPATIENT)
Dept: NON INVASIVE DIAGNOSTICS | Facility: HOSPITAL | Age: 60
Discharge: HOME | End: 2020-11-24
Payer: COMMERCIAL

## 2020-11-24 DIAGNOSIS — R01.1 HEART MURMUR: ICD-10-CM

## 2020-11-24 DIAGNOSIS — I25.2 HISTORY OF MYOCARDIAL INFARCTION: ICD-10-CM

## 2020-11-24 PROCEDURE — 93306 TTE W/DOPPLER COMPLETE: CPT

## 2020-11-25 PROCEDURE — 93306 TTE W/DOPPLER COMPLETE: CPT | Performed by: INTERNAL MEDICINE

## 2020-12-28 ENCOUNTER — TELEPHONE (OUTPATIENT)
Dept: CARDIOLOGY CLINIC | Facility: CLINIC | Age: 60
End: 2020-12-28

## 2020-12-28 NOTE — TELEPHONE ENCOUNTER
Patient calling for Echo result that are in Epic  She does not have an appt to discuss them    [phone]

## 2021-01-13 NOTE — TELEPHONE ENCOUNTER
PC from patient again, looking for ECHO results  Her next appt is for her 6 mo f/u in Feb  Would like to know things are okay   [phone]

## 2021-01-20 DIAGNOSIS — I25.10 CORONARY ARTERIOSCLEROSIS: ICD-10-CM

## 2021-01-20 DIAGNOSIS — I25.2 HISTORY OF MYOCARDIAL INFARCTION: ICD-10-CM

## 2021-01-20 RX ORDER — ATORVASTATIN CALCIUM 40 MG/1
40 TABLET, FILM COATED ORAL DAILY
Qty: 90 TABLET | Refills: 3 | Status: SHIPPED | OUTPATIENT
Start: 2021-01-20 | End: 2022-02-23 | Stop reason: SDUPTHER

## 2021-01-20 RX ORDER — ATORVASTATIN CALCIUM 40 MG/1
40 TABLET, FILM COATED ORAL DAILY
Qty: 90 TABLET | Refills: 3 | Status: CANCELLED | OUTPATIENT
Start: 2021-01-20

## 2021-01-20 NOTE — TELEPHONE ENCOUNTER
Patient left voicemail stating she has new mail-order with [facility] and needed scripts transferred over to them as [facility] has ended        Last OV  7/30/2020  Future OV  7/30/2021

## 2021-01-20 NOTE — TELEPHONE ENCOUNTER
Pt called states mailorder pharmacy has changed and needs rx sent---did not leave which medication or name of pharmacy to go to left message to return call with needed information

## 2021-02-04 ENCOUNTER — OFFICE VISIT (OUTPATIENT)
Dept: CARDIOLOGY CLINIC | Facility: CLINIC | Age: 61
End: 2021-02-04
Payer: COMMERCIAL

## 2021-02-04 VITALS
DIASTOLIC BLOOD PRESSURE: 80 MMHG | WEIGHT: 155 LBS | HEART RATE: 64 BPM | TEMPERATURE: 98.4 F | BODY MASS INDEX: 28.35 KG/M2 | SYSTOLIC BLOOD PRESSURE: 140 MMHG

## 2021-02-04 DIAGNOSIS — Z95.5 H/O HEART ARTERY STENT: Primary | ICD-10-CM

## 2021-02-04 DIAGNOSIS — R01.1 HEART MURMUR: ICD-10-CM

## 2021-02-04 DIAGNOSIS — I65.29 STENOSIS OF CAROTID ARTERY, UNSPECIFIED LATERALITY: ICD-10-CM

## 2021-02-04 DIAGNOSIS — D3A.090 CARCINOID TUMOR OF LUNG, UNSPECIFIED WHETHER MALIGNANT: ICD-10-CM

## 2021-02-04 DIAGNOSIS — I25.2 HISTORY OF MYOCARDIAL INFARCTION: ICD-10-CM

## 2021-02-04 DIAGNOSIS — Z87.891 FORMER SMOKER: ICD-10-CM

## 2021-02-04 DIAGNOSIS — I25.10 CORONARY ARTERIOSCLEROSIS: ICD-10-CM

## 2021-02-04 PROCEDURE — 99214 OFFICE O/P EST MOD 30 MIN: CPT

## 2021-02-04 PROCEDURE — 1036F TOBACCO NON-USER: CPT

## 2021-02-04 RX ORDER — METOPROLOL SUCCINATE 25 MG/1
25 TABLET, EXTENDED RELEASE ORAL DAILY
Qty: 90 TABLET | Refills: 3 | Status: SHIPPED | OUTPATIENT
Start: 2021-02-04 | End: 2022-02-18 | Stop reason: SDUPTHER

## 2021-02-05 NOTE — PROGRESS NOTES
Cardiology   MD Lexy Cancino MD Karsten Pellegrini, DO, [facility] MD Eitan Murrell DO, Dana Peralta DO, [facility] [place]  -------------------------------------------------------------------  [facility]  [address] [place] [id number]  [phone]  [phone]  02/04/21  Viri Albrecht  YOB: 1960   MRN: [id number]      Referring Physician: No referring provider defined for this encounter  HPI: Viri Albrecht is a 63 y o  female with coronary artery disease status post PCI to the LAD and circumflex arteries 2007, 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, dyslipidemia, former smoker, quit in 2007, mild MR and TR on echocardiogram with ejection fraction 55% 2015 who presents today for re-evaluation  She was initially lost to follow-up with Cardiology over the years since her stents were placed  She denies any angina symptoms at this time  Denies shortness of breath  At her last visit I had ordered a echocardiogram to re-evaluate the mild valvular disease seen on prior echo  Echo shows EF 56% mild LVH grade 1 diastolic dysfunction, apical septal dyskinesis is noted with normal motion of the remaining walls, trace mitral regurgitation, trace tricuspid regurgitation with estimated PASP of 25 mmHg and physiologic pericardial effusion  She continues to feel well and denies any acute complaints at this time  Review of Systems   Constitutional: Negative for chills and fever  HENT: Negative for facial swelling and sore throat  Eyes: Negative for visual disturbance  Respiratory: Negative for cough, chest tightness, shortness of breath and wheezing  Cardiovascular: Negative for chest pain, palpitations and leg swelling  Gastrointestinal: Negative for abdominal pain, blood in stool, constipation, diarrhea, nausea and vomiting  Endocrine: Negative for cold intolerance and heat intolerance     Genitourinary: Negative for decreased urine volume, difficulty urinating, dysuria and hematuria  Musculoskeletal: Negative for arthralgias, back pain and myalgias  Skin: Negative for rash  Neurological: Negative for dizziness, syncope, weakness and numbness  Psychiatric/Behavioral: Negative for agitation, behavioral problems and confusion  The patient is not nervous/anxious  OBJECTIVE  Vitals:    02/04/21 1430   BP: 140/80   Pulse: 64   Temp: 98 4 °F (36 9 °C)       Physical Exam  General appearance: alert and oriented, in no acute distress  Head: Normocephalic, without obvious abnormality, atraumatic  Eyes: conjunctivae/corneas clear  Anicteric  Neck: no adenopathy, no carotid bruit, no JVD  Lungs: clear to auscultation bilaterally  Heart: regular rate and rhythm, S1, S2 normal, no murmur, no click, rub or gallop  Abdomen:  soft, non-tender; bowel sounds normal; no masses,  no organomegaly  Extremities: extremities normal, warm and well-perfused; no cyanosis, clubbing, or edema  Skin: Skin color, texture, turgor normal  No rashes or lesions   EKG:  No results found for this visit on 02/04/21  IMPRESSION:  1  Coronary artery disease status post PCI with drug-eluting stents to the LAD and circumflex in 2007 in the setting of myocardial infarction  Sounds like this was a non ST segment myocardial infarction  2  Preserved ejection fraction by echo with mild valvular disease  3  Mild MR, mild TR  4  Dyslipidemia  5  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, resolved status post resection    DISCUSSION/RECOMMENDATIONS:   She continues to do well at this time  Denies any acute complaints today   Echo with mild valvular disease, preserved ejection fraction    This point would continue with current medications  She had no symptoms concerning for worsening CAD, would hold off on stress testing at this time   Tolerating Lipitor 40 mg well, we switched her from simvastatin 80 mg to Lipitor 40 mg at her last visit       She is having difficulty taking metoprolol twice a day and often misses the 2nd dose  Would just change to metoprolol succinate 25 mg daily  Otherwise she is doing well, could follow with Cardiology on a yearly basis    Miki Wilson DO, [facility] [place]  --------------------------------------------------------------------------------  TREADMILL STRESS  No results found for this or any previous visit    ----------------------------------------------------------------------------------------------  NUCLEAR STRESS TEST: No results found for this or any previous visit  No results found for this or any previous visit     --------------------------------------------------------------------------------  CATH:  No results found for this or any previous visit   --------------------------------------------------------------------------------  ECHO:   Results for orders placed during the hospital encounter of 20   Echo complete with contrast if indicated    [facility] [address]    Transthoracic Echocardiogram  2D, M-mode, Doppler, and Color Doppler    Study date:  2020    Patient: Donavan Drew  MR number: [id number]  Account number: [de-identified]  : 1960  Age: 63 years  Gender: Female  Status: Outpatient  Location: [facility]  Height: 62 in  Weight: 157 7 lb  BP: 120/ 62 mmHg    Indications: Murmur    Diagnoses: R01 1 - Cardiac murmur, unspecified    Sonographer:  Yoko Damian  Interpreting Physician:  Nawaf Hendrickson DO  Primary Physician:  BARBARA Berry  Referring Physician:  BARBARA Berry  Group:  [facility]    SUMMARY    SUMMARY:  1  This is a technically adequate study  2  Left ventricle is mildly dilated with normal systolic function  Left ventricular ejection fraction is estimated at 56%  3  Mild concentric left ventricular hypertrophy  4  Probable grade 1 diastolic dysfunction  5   Apical septal dyskinesis is noted with normal motion of the remaining walls  6  Mild left atrial dilatation  7  Aortic valve is sclerotic with adequate separation  8  Trace mitral regurgitation  9  Trace tricuspid regurgitation with pulmonary artery systolic pressure is estimated at 25 mm Hg  10  Aortic root appears sclerotic  11  Physiologic pericardial effusion without echocardiographic indications of tamponade physiology  12  There is no study for comparison    HISTORY: PRIOR HISTORY: CAD, Dyslipidemia, S/P PCI Risk factors: a former history of cigarette use (quitting more than one month ago)  PROCEDURE: The study was performed in the [facility]  This was a routine study  The transthoracic approach was used  The study included complete 2D imaging, M-mode, complete spectral Doppler, and color Doppler  The heart rate was 58  bpm, at the start of the study  Image quality was adequate  LEFT VENTRICLE: Left ventricle is mildly dilated with normal systolic function  Left ventricular ejection fraction is estimated at 56%  Mild concentric left ventricular hypertrophy  Probable grade 1 diastolic dysfunction  There appears to  be apical dyskinesis noted at the apical septal wall noted best in the apical four-chamber view  RIGHT VENTRICLE: Right ventricle is normal in size and function  LEFT ATRIUM: Left atrium is mildly dilated  ATRIAL SEPTUM: The interatrial septum appears to be grossly normal without evidence of shunting by color-flow Doppler  RIGHT ATRIUM: Right atrium is normal in size  MITRAL VALVE: Mitral valve opens well  No evidence of mitral stenosis  Trace mitral regurgitation  AORTIC VALVE: Aortic valve sclerotic with adequate separation  Aortic valve is trileaflet  No evidence of aortic stenosis or aortic regurgitation  TRICUSPID VALVE: Tricuspid valve opens well  Trace tricuspid regurgitation   Pulmonary artery systolic pressures are estimated at 25 mm Hg    PULMONIC VALVE: Pulmonic valve not well visualized  No evidence of pulmonic stenosis  Trace pulmonic regurgitation    PERICARDIUM: Physiologic pericardial effusion without echocardiographic indications of tamponade physiology    AORTA: Aortic root is sclerotic, but appears normal in size  SYSTEMIC VEINS: IVC: The IVC is normal size with collapse  SYSTEM MEASUREMENT TABLES    2D  %FS: 35 62 %  Ao Diam: 3 06 cm  EDV(Teich): 147 81 ml  EF(Teich): 64 51 %  ESV(Teich): 52 46 ml  IVSd: 1 09 cm  LA Area: 13 49 cm2  LA Diam: 4 49 cm  LVEDV MOD A4C: 145 65 ml  LVEF MOD A4C: 56 5 %  LVESV MOD A4C: 63 36 ml  LVIDd: 5 51 cm  LVIDs: 3 55 cm  LVLd A4C: 8 55 cm  LVLs A4C: 7 09 cm  LVPWd: 1 07 cm  RA Area: 12 16 cm2  RVIDd: 3 83 cm  SV MOD A4C: 82 29 ml  SV(Teich): 95 35 ml    CW  TR Vmax: 2 33 m/s  TR maxP 79 mmHg    MM  TAPSE: 2 94 cm    PW  E' Sept: 0 08 m/s  E/E' Sept: 12 01  MV A Humberto: 0 87 m/s  MV Dec Granite: 3 7 m/s2  MV DecT: 264 37 ms  MV E Humberto: 0 98 m/s  MV E/A Ratio: 1 12  MV PHT: 76 67 ms  MVA By PHT: 2 87 cm2    [facility]    Prepared and electronically signed by    Sherly Kraft DO  Signed  09:51:07       No results found for this or any previous visit   --------------------------------------------------------------------------------  HOLTER  No results found for this or any previous visit  No results found for this or any previous visit   --------------------------------------------------------------------------------  CAROTIDS  No results found for this or any previous visit    --------------------------------------------------------------------------------  Diagnoses and all orders for this visit:    H/O heart artery stent  -     metoprolol succinate (TOPROL-XL) 25 mg 24 hr tablet;  Take 1 tablet (25 mg total) by mouth daily    Heart murmur    History of myocardial infarction    Stenosis of carotid artery, unspecified laterality    Coronary arteriosclerosis    Carcinoid tumor of lung, unspecified whether malignant    Former smoker -  Quit 2007       ======================================================    Past Medical History:   Diagnosis Date    Carcinoid tumor of lung 2009    Carcinoid lung neoplasm - stage 1 w resection 2010; last assessed - 17Jun2013    Ovarian cancer ([facility] [address] ) [id number]    Right stage 1; [id number]    Thrombophlebitis of arm, left     Thrombophlebitis left; last assessed - [id number]     Past Surgical History:   Procedure Laterality Date    BREAST BIOPSY Right     Biopsy Breast Percutaneous needle core    BREAST BIOPSY Left     2002    CLOSED REDUCTION WRIST FRACTURE Left 07/20/2011    COLONOSCOPY  05/2009    Complete Colonoscopy    CORONARY ANGIOPLASTY WITH STENT PLACEMENT      HYSTERECTOMY      age 51    LUNG LOBECTOMY      RLL w/early stage carcinoid tumor; Resolved - Approx [id number]    TOTAL ABDOMINAL HYSTERECTOMY W/ BILATERAL SALPINGOOPHORECTOMY           Medications  Current Outpatient Medications   Medication Sig Dispense Refill    aspirin 81 MG tablet Take 1 tablet by mouth daily      atorvastatin (LIPITOR) 40 mg tablet Take 1 tablet (40 mg total) by mouth daily 90 tablet 3    Cholecalciferol (VITAMIN D3) 1000 units CAPS Take 1 capsule by mouth daily      Magnesium 250 MG TABS Take 1 tablet by mouth      Omega-3 Fatty Acids (FISH OIL) 1200 MG CAPS Take 2 capsules by mouth daily        metoprolol succinate (TOPROL-XL) 25 mg 24 hr tablet Take 1 tablet (25 mg total) by mouth daily 90 tablet 3     No current facility-administered medications for this visit  Allergies   Allergen Reactions    Contrast  [Iodinated Diagnostic Agents] Hives    Iodine      Other reaction(s):  Other (See Comments)  Contrast dye    Niacin      Annotation - [id number]: felt poor w med       Social History     Socioeconomic History    Marital status:      Spouse name: Not on file    Number of children: Not on file    Years of education: Not on file    Highest education level: Not on file   Occupational History    Occupation: [employer] work - Full-Time   Social Needs    Financial resource strain: Not on file    Food insecurity     Worry: Not on file     Inability: Not on file   [employer] needs     Medical: Not on file     Non-medical: Not on file   Tobacco Use    Smoking status: Former Smoker     Quit date: 2007     Years since quittin 1    Smokeless tobacco: Never Used   Substance and Sexual Activity    Alcohol use: Yes     Frequency: Monthly or less     Comment: minimum alcohol consumption    Drug use: Never    Sexual activity: Not Currently   Lifestyle    Physical activity     Days per week: Not on file     Minutes per session: Not on file    Stress: Not on file   Relationships    Social connections     Talks on phone: Not on file     Gets together: Not on file     Attends Yarsani service: Not on file     Active member of club or organization: Not on file     Attends meetings of clubs or organizations: Not on file     Relationship status: Not on file    Intimate partner violence     Fear of current or ex partner: Not on file     Emotionally abused: Not on file     Physically abused: Not on file     Forced sexual activity: Not on file   Other Topics Concern    Not on file   Social History Narrative    Not on file        Family History   Problem Relation Age of Onset    Breast cancer Mother 76    Diabetes Mother         Diabetes Mellitus    No Known Problems Sister     No Known Problems Daughter     Breast cancer Sister 48    No Known Problems Sister     No Known Problems Sister        Lab Results   Component Value Date    WBC 3 79 (L) 2020    HGB 13 1 2020    HCT 40 2 2020    MCV 92 2020     2020      Lab Results   Component Value Date    SODIUM 142 2020    K 4 4 2020     2020    CO2 26 2020    BUN 22 2020    CREATININE 0 66 2020    GLUC 104 11/05/2016    CALCIUM 8 5 07/31/2020      No results found for: HGBA1C   Lab Results   Component Value Date    CHOL 124 07/01/2015    CHOL 117 02/12/2014     Lab Results   Component Value Date    HDL 54 07/31/2020    HDL 65 (H) 06/12/2019    HDL 62 (H) 06/01/2018     Lab Results   Component Value Date    LDLCALC 72 07/31/2020    [address] 68 06/12/2019    LDLCALC 86 06/01/2018     Lab Results   Component Value Date    TRIG 57 07/31/2020    TRIG 25 06/12/2019    TRIG 30 06/01/2018     No results found for: CHOLHDL   No results found for: INR, PROTIME       Patient Active Problem List    Diagnosis Date Noted    Former smoker -  Quit 2007 07/30/2020    H/O heart artery stent 05/12/2018    History of myocardial infarction 05/12/2018    Carcinoid tumor of lung w resection 2010 05/12/2018    S/P SEKOU-BSO 05/12/2018    Carotid stenosis 07/23/2015    History of vertigo 05/27/2015    Tinnitus 05/27/2015    Heart murmur 05/27/2015    Venous (peripheral) insufficiency 01/07/2014    Coronary arteriosclerosis 05/20/2012    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, s/p SEKOU/ BSO  12/01/2009       Portions of the record may have been created with voice recognition software  Occasional wrong word or "sound a like" substitutions may have occurred due to the inherent limitations of voice recognition software  Read the chart carefully and recognize, using context, where substitutions have occurred          Shona Hinton DO, [facility] [place]  2/4/2021 7:16 PM

## 2021-04-09 ENCOUNTER — HOSPITAL ENCOUNTER (OUTPATIENT)
Dept: MAMMOGRAPHY | Facility: CLINIC | Age: 61
Discharge: HOME | End: 2021-04-09
Payer: COMMERCIAL

## 2021-04-09 VITALS — WEIGHT: 155 LBS | HEIGHT: 62 IN | BODY MASS INDEX: 28.52 KG/M2

## 2021-04-09 DIAGNOSIS — Z12.31 VISIT FOR SCREENING MAMMOGRAM: ICD-10-CM

## 2021-04-09 DIAGNOSIS — Z12.39 BREAST CANCER SCREENING: ICD-10-CM

## 2021-04-09 PROCEDURE — 77063 BREAST TOMOSYNTHESIS BI: CPT

## 2021-04-09 PROCEDURE — 77067 SCR MAMMO BI INCL CAD: CPT

## 2021-04-13 ENCOUNTER — TELEPHONE (OUTPATIENT)
Dept: FAMILY MEDICINE CLINIC | Facility: CLINIC | Age: 61
End: 2021-04-13

## 2021-04-14 ENCOUNTER — IMMUNIZATIONS (OUTPATIENT)
Dept: FAMILY MEDICINE CLINIC | Facility: HOSPITAL | Age: 61
End: 2021-04-14

## 2021-04-14 DIAGNOSIS — Z23 ENCOUNTER FOR IMMUNIZATION: Primary | ICD-10-CM

## 2021-04-14 PROCEDURE — 0011A SARS-COV-2 / COVID-19 MRNA VACCINE (MODERNA) 100 MCG: CPT

## 2021-04-14 PROCEDURE — 91301 SARS-COV-2 / COVID-19 MRNA VACCINE (MODERNA) 100 MCG: CPT

## 2021-05-14 ENCOUNTER — IMMUNIZATIONS (OUTPATIENT)
Dept: FAMILY MEDICINE CLINIC | Facility: HOSPITAL | Age: 61
End: 2021-05-14

## 2021-05-14 DIAGNOSIS — Z23 ENCOUNTER FOR IMMUNIZATION: Primary | ICD-10-CM

## 2021-05-14 PROCEDURE — 91301 SARS-COV-2 / COVID-19 MRNA VACCINE (MODERNA) 100 MCG: CPT

## 2021-05-14 PROCEDURE — 0012A SARS-COV-2 / COVID-19 MRNA VACCINE (MODERNA) 100 MCG: CPT

## 2021-08-11 NOTE — PATIENT INSTRUCTIONS
Reviewed health hx/ meds  She is  Doing very well here today  Her BP is 124/68 - she does have history MI with stent placement 2007, she is now seeing [address] [facility] ( Dr Kimani López ) yearly, she had an Echo last Fall  Cardiology switched her to Atorvastatin 40 mg daily along w long-acting Metoprolol  She continues on Aspirin 81 mg daily  Remote history syncope, no issues past few years      She has no increased swelling left lower extremity, uses compression stocking      We did review previous blood work, last year CBC, CMP unremarkable, slip given to redo fasting blood work  She is up to date with Lipid screening  Await redo  She is up to date with Diabetes screening  Glucose 94 last yr  Last TSH 2015 was okay  Immunization History   Administered Date(s) Administered    Influenza Quadrivalent, 6-35 Months IM 11/04/2016    Influenza, recombinant, quadrivalent,injectable, preservative free 01/28/2020    Influenza, seasonal, injectable 12/10/2012    Pneumococcal Polysaccharide PPV23 06/11/2019    SARS-CoV-2 / COVID-19 mRNA IM ([facility]) 04/14/2021, 05/14/2021       She does do yearly Flu shot  -  Should do in Oct  Tdap/tetanus shot due -  She will do in future/ can return  (done every 10 yrs for superficial cuts, every 5 yrs for deep wounds)   Can also look into coverage for new shingles shot, Shingrix       - s/p resection 2010 early stage carcinoid    former smoker-  over 20 pack yr -  quit 2007 -  Can redo CT Chest -  She will consider       Regarding Colon Cancer screening, we discussed Colonoscopy vs ColoGuard is indicated starting at age 36-53  Patient declined prev  Did have colonoscopy back in 2009 -  She will consider ColoGuard     She does not see her Gynecologist routinely  She had [facility] BSO for stage I ovarian cancer back in 2009  Discussed screening Mammogram, this is up-to-date,   Her mammogram was okay April 2021        Hepatitis C Screening indicated for 'baby boomers' -   previously perfomed and was negative      Continue to try to watch healthy diet, exercise routinely ( very active w work)  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

## 2021-08-12 ENCOUNTER — OFFICE VISIT (OUTPATIENT)
Dept: FAMILY MEDICINE CLINIC | Facility: CLINIC | Age: 61
End: 2021-08-12
Payer: COMMERCIAL

## 2021-08-12 VITALS
DIASTOLIC BLOOD PRESSURE: 68 MMHG | BODY MASS INDEX: 27.6 KG/M2 | HEART RATE: 52 BPM | HEIGHT: 62 IN | OXYGEN SATURATION: 98 % | WEIGHT: 150 LBS | SYSTOLIC BLOOD PRESSURE: 124 MMHG

## 2021-08-12 DIAGNOSIS — C56.1 MALIGNANT NEOPLASM OF RIGHT OVARY (HCC): ICD-10-CM

## 2021-08-12 DIAGNOSIS — I25.10 CORONARY ARTERIOSCLEROSIS: ICD-10-CM

## 2021-08-12 DIAGNOSIS — M76.61 RIGHT ACHILLES TENDINITIS: ICD-10-CM

## 2021-08-12 DIAGNOSIS — Z00.00 ENCOUNTER FOR ANNUAL PHYSICAL EXAM: Primary | ICD-10-CM

## 2021-08-12 DIAGNOSIS — D3A.090 CARCINOID TUMOR OF LUNG, UNSPECIFIED WHETHER MALIGNANT: ICD-10-CM

## 2021-08-12 DIAGNOSIS — I25.2 HISTORY OF MYOCARDIAL INFARCTION: ICD-10-CM

## 2021-08-12 DIAGNOSIS — Z12.11 COLON CANCER SCREENING: ICD-10-CM

## 2021-08-12 PROCEDURE — 3008F BODY MASS INDEX DOCD: CPT | Performed by: FAMILY MEDICINE

## 2021-08-12 PROCEDURE — 99396 PREV VISIT EST AGE 40-64: CPT | Performed by: FAMILY MEDICINE

## 2021-08-12 PROCEDURE — 3725F SCREEN DEPRESSION PERFORMED: CPT | Performed by: FAMILY MEDICINE

## 2021-08-12 PROCEDURE — 1036F TOBACCO NON-USER: CPT | Performed by: FAMILY MEDICINE

## 2021-08-12 RX ORDER — VITAMIN E 268 MG
400 CAPSULE ORAL DAILY
COMMUNITY

## 2021-08-12 NOTE — PROGRESS NOTES
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, s/p SEKOU/ BSO     Relevant Orders    CBC    Comprehensive metabolic panel       Respiratory    Carcinoid tumor of lung w resection     Relevant Orders    CBC    Comprehensive metabolic panel       Cardiovascular and Mediastinum    Coronary arteriosclerosis    Relevant Orders    CBC    Comprehensive metabolic panel    Lipid panel       Musculoskeletal and Integument    Right Achilles tendinitis       Other    History of myocardial infarction    Relevant Orders    Lipid panel      Other Visit Diagnoses     Encounter for annual physical exam    -  Primary    BMI 27 0-27 9,adult        Colon cancer screening        Relevant Orders    Cologuard          Patient Instructions     Reviewed health hx/ meds  She is  Doing very well here today  Her BP is 124/68 - she does have history MI with stent placement , she is now seeing [address] [facility] ( Dr Maya Burgos ) yearly, she had an Echo last Fall  Cardiology switched her to Atorvastatin 40 mg daily along w long-acting Metoprolol  She continues on Aspirin 81 mg daily  Remote history syncope, no issues past few years      She has no increased swelling left lower extremity, uses compression stocking      We did review previous blood work, last year CBC, CMP unremarkable, slip given to redo fasting blood work  She is up to date with Lipid screening  Await redo      She is up to date with Diabetes screening  Glucose 94 last yr  Last TSH 2015 was okay  Immunization History   Administered Date(s) Administered    Influenza Quadrivalent, 6-35 Months IM 11/04/2016    Influenza, recombinant, quadrivalent,injectable, preservative free 01/28/2020    Influenza, seasonal, injectable 12/10/2012    Pneumococcal Polysaccharide PPV23 06/11/2019    SARS-CoV-2 / COVID-19 mRNA IM (Driss Been) 04/14/2021, 05/14/2021       She does do yearly Flu shot  -  Should do in Oct  Tdap/tetanus shot due -  She will do in future/ can return  (done every 10 yrs for superficial cuts, every 5 yrs for deep wounds)   Can also look into coverage for new shingles shot, Shingrix       - s/p resection 2010 early stage carcinoid    former smoker-  over 20 pack yr -  quit 2007 -  Can redo CT Chest -  She will consider       Regarding Colon Cancer screening, we discussed Colonoscopy vs ColoGuard is indicated starting at age 36-53  Patient declined prev  Did have colonoscopy back in 2009 -  She will consider ColoGuard     She does not see her Gynecologist routinely  She had [facility] BSO for stage I ovarian cancer back in 2009  Discussed screening Mammogram, this is up-to-date,   Her mammogram was okay April 2021  Hepatitis C Screening indicated for 'baby boomers' -   previously perfomed and was negative      Continue to try to watch healthy diet, exercise routinely ( very active w work)  She does have right achilles tendon insert calcinosis -  Stretch, observe and can see Podiatry/ Foot & Ankle if pain worsens -   Use cushioning at work as needed  We will see her again in 12 months, sooner as needed               Chief Complaint     Chief Complaint   Patient presents with    Physical Exam       History of Present Illness   Loree Lares is a 63y o -year-old female who is in today for a routine check -  She feels well -  she is working hard in a cold environment, no exertional complaints    She did see Cardiology, they changed her metoprolol to long-acting since Senate, changed her to atorvastatin  Her only issue today is some inflammation at Achilles insert on right      Review of Systems   Review of Systems   Constitutional: Negative for appetite change, fatigue, fever and unexpected weight change  HENT: Negative for sore throat and trouble swallowing  Respiratory: Negative for cough, chest tightness and shortness of breath  Cardiovascular: Negative for chest pain, palpitations and leg swelling (mild left -  uses compression)  Gastrointestinal: Negative for abdominal pain, blood in stool, nausea and vomiting  No acid reflux     No change in bowel   Genitourinary: Negative for dysuria and hematuria  Neurological: Negative for dizziness, syncope, light-headedness and headaches  Psychiatric/Behavioral: Negative for behavioral problems, confusion and sleep disturbance  Active Problem List     Patient Active Problem List   Diagnosis    History of vertigo    Tinnitus    Venous (peripheral) insufficiency    Coronary arteriosclerosis    Carotid stenosis    H/O heart artery stent    History of myocardial infarction    Carcinoid tumor of lung w resection 2010    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, s/p SEKOU/ BSO     S/P SEKOU-BSO    Former smoker -  Quit 2007    Right Achilles tendinitis       Past Medical History:  Reviewed    Past Surgical History:  Reviewed    Family History:  Reviewed    Social History:  Reviewed    Objective     Vitals:    08/12/21 1122   BP: 124/68   BP Location: Left arm   Patient Position: Sitting   Cuff Size: Standard   Pulse: (!) 52   SpO2: 98%   Weight: 68 kg (150 lb)   Height: 5' 2" (1 575 m)     Body mass index is 27 44 kg/m²      BP Readings from Last 3 Encounters:   08/12/21 124/68   02/04/21 140/80   08/10/20 120/62       Wt Readings from Last 3 Encounters:   08/12/21 68 kg (150 lb)   04/09/21 70 3 kg (155 lb)   02/04/21 70 3 kg (155 lb)       Physical Exam  Constitutional:       General: She is not in acute distress  Appearance: Normal appearance  She is well-developed  HENT:      Ears:      Comments: Mild wax right  Eyes:      General: No scleral icterus  Neck:      Vascular: No carotid bruit  Cardiovascular:      Rate and Rhythm: Normal rate and regular rhythm  Heart sounds: Normal heart sounds  No murmur heard  Pulmonary:      Effort: Pulmonary effort is normal  No respiratory distress  Breath sounds: Normal breath sounds  Abdominal:      Palpations: Abdomen is soft  Tenderness: There is no abdominal tenderness  Musculoskeletal:      Right lower leg: No edema  Left lower leg: No edema  Lymphadenopathy:      Cervical: No cervical adenopathy  Skin:     Coloration: Skin is not jaundiced  Neurological:      Mental Status: She is alert and oriented to person, place, and time  Psychiatric:         Mood and Affect: Mood normal          Behavior: Behavior normal          ALLERGIES:  Allergies   Allergen Reactions    Contrast  [Iodinated Diagnostic Agents] Hives    Iodine - Food Allergy      Other reaction(s): Other (See Comments)  Contrast dye    Niacin      Annotation - [id number]: felt poor w med       Current Medications     Current Outpatient Medications   Medication Sig Dispense Refill    aspirin 81 MG tablet Take 1 tablet by mouth daily      atorvastatin (LIPITOR) 40 mg tablet Take 1 tablet (40 mg total) by mouth daily 90 tablet 3    Cholecalciferol (VITAMIN D3) 1000 units CAPS Take 1 capsule by mouth daily      Magnesium 250 MG TABS Take 1 tablet by mouth Taking 400 mg daily      metoprolol succinate (TOPROL-XL) 25 mg 24 hr tablet Take 1 tablet (25 mg total) by mouth daily 90 tablet 3    Omega-3 Fatty Acids (FISH OIL) 1200 MG CAPS Take 2 capsules by mouth daily        vitamin E, tocopherol, 400 units capsule Take 400 Units by mouth daily       No current facility-administered medications for this visit  Orders Placed This Encounter   Procedures    Cologuard    CBC    Comprehensive metabolic panel    Lipid panel         Anitha Prophet, DO

## 2021-08-18 ENCOUNTER — APPOINTMENT (OUTPATIENT)
Dept: LAB | Facility: HOSPITAL | Age: 61
End: 2021-08-18
Payer: COMMERCIAL

## 2021-08-18 LAB
ALBUMIN SERPL BCP-MCNC: 3.7 G/DL (ref 3.5–5)
ALP SERPL-CCNC: 53 U/L (ref 46–116)
ALT SERPL W P-5'-P-CCNC: 37 U/L (ref 12–78)
ANION GAP SERPL CALCULATED.3IONS-SCNC: 13 MMOL/L (ref 4–13)
AST SERPL W P-5'-P-CCNC: 24 U/L (ref 5–45)
BILIRUB SERPL-MCNC: 1.36 MG/DL (ref 0.2–1)
BUN SERPL-MCNC: 26 MG/DL (ref 5–25)
CALCIUM SERPL-MCNC: 8.8 MG/DL (ref 8.3–10.1)
CHLORIDE SERPL-SCNC: 110 MMOL/L (ref 100–108)
CHOLEST SERPL-MCNC: 133 MG/DL (ref 50–200)
CO2 SERPL-SCNC: 22 MMOL/L (ref 21–32)
CREAT SERPL-MCNC: 0.84 MG/DL (ref 0.6–1.3)
ERYTHROCYTE [DISTWIDTH] IN BLOOD BY AUTOMATED COUNT: 12.4 % (ref 11.6–15.1)
GFR SERPL CREATININE-BSD FRML MDRD: 75 ML/MIN/1.73SQ M
GLUCOSE P FAST SERPL-MCNC: 101 MG/DL (ref 65–99)
HCT VFR BLD AUTO: 37.9 % (ref 34.8–46.1)
HDLC SERPL-MCNC: 58 MG/DL
HGB BLD-MCNC: 12.6 G/DL (ref 11.5–15.4)
LDLC SERPL CALC-MCNC: 71 MG/DL (ref 0–100)
MCH RBC QN AUTO: 29.5 PG (ref 26.8–34.3)
MCHC RBC AUTO-ENTMCNC: 33.2 G/DL (ref 31.4–37.4)
MCV RBC AUTO: 89 FL (ref 82–98)
NONHDLC SERPL-MCNC: 75 MG/DL
PLATELET # BLD AUTO: 190 THOUSANDS/UL (ref 149–390)
PMV BLD AUTO: 10.3 FL (ref 8.9–12.7)
POTASSIUM SERPL-SCNC: 4.4 MMOL/L (ref 3.5–5.3)
PROT SERPL-MCNC: 7 G/DL (ref 6.4–8.2)
RBC # BLD AUTO: 4.27 MILLION/UL (ref 3.81–5.12)
SODIUM SERPL-SCNC: 145 MMOL/L (ref 136–145)
TRIGL SERPL-MCNC: 22 MG/DL
WBC # BLD AUTO: 3.74 THOUSAND/UL (ref 4.31–10.16)

## 2021-08-18 PROCEDURE — 36415 COLL VENOUS BLD VENIPUNCTURE: CPT | Performed by: FAMILY MEDICINE

## 2021-08-18 PROCEDURE — 85027 COMPLETE CBC AUTOMATED: CPT | Performed by: FAMILY MEDICINE

## 2021-08-18 PROCEDURE — 80053 COMPREHEN METABOLIC PANEL: CPT | Performed by: FAMILY MEDICINE

## 2021-08-18 PROCEDURE — 80061 LIPID PANEL: CPT | Performed by: FAMILY MEDICINE

## 2022-02-18 ENCOUNTER — OFFICE VISIT (OUTPATIENT)
Dept: CARDIOLOGY CLINIC | Facility: CLINIC | Age: 62
End: 2022-02-18
Payer: COMMERCIAL

## 2022-02-18 VITALS
SYSTOLIC BLOOD PRESSURE: 160 MMHG | BODY MASS INDEX: 29.48 KG/M2 | HEART RATE: 58 BPM | WEIGHT: 160.2 LBS | DIASTOLIC BLOOD PRESSURE: 86 MMHG | HEIGHT: 62 IN

## 2022-02-18 DIAGNOSIS — Z95.5 H/O HEART ARTERY STENT: ICD-10-CM

## 2022-02-18 DIAGNOSIS — I10 HYPERTENSION, UNSPECIFIED TYPE: Primary | ICD-10-CM

## 2022-02-18 PROCEDURE — 99214 OFFICE O/P EST MOD 30 MIN: CPT

## 2022-02-18 PROCEDURE — 93000 ELECTROCARDIOGRAM COMPLETE: CPT

## 2022-02-18 PROCEDURE — 1036F TOBACCO NON-USER: CPT

## 2022-02-18 PROCEDURE — 3008F BODY MASS INDEX DOCD: CPT

## 2022-02-18 RX ORDER — METOPROLOL SUCCINATE 25 MG/1
25 TABLET, EXTENDED RELEASE ORAL DAILY
Qty: 90 TABLET | Refills: 3 | Status: CANCELLED | OUTPATIENT
Start: 2022-02-18

## 2022-02-18 RX ORDER — METOPROLOL SUCCINATE 25 MG/1
25 TABLET, EXTENDED RELEASE ORAL DAILY
Qty: 90 TABLET | Refills: 3 | Status: SHIPPED | OUTPATIENT
Start: 2022-02-18

## 2022-02-18 RX ORDER — AMLODIPINE BESYLATE 5 MG/1
5 TABLET ORAL DAILY
Qty: 90 TABLET | Refills: 3 | Status: SHIPPED | OUTPATIENT
Start: 2022-02-18

## 2022-02-18 NOTE — PROGRESS NOTES
Cardiology   MD Rayray Nelson MD Rollen Lis, DO, MD Taym Segura DO, Chaparro Mata DO, [facility] [place]  -------------------------------------------------------------------  [facility]  [address], [place] [address]  [phone]  [phone]  02/18/22  Ja Martinez  YOB: 1960   MRN: [id number]      Referring Physician: Sarah Louise DO  [address]  [address]  [place], [place] [address]     HPI: Ja Martinez is a 57 y o  female with: coronary artery disease status post PCI to the LAD and circumflex arteries 2007, 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, dyslipidemia, former smoker, quit in 2007, mild MR and TR on echocardiogram with ejection fraction 55% 2015 who presents today for re-evaluation  She was initially lost to follow-up with Cardiology over the years since her stents were placed  Echo 11/2020 shows EF 56% mild LVH grade 1 diastolic dysfunction, apical septal dyskinesis is noted with normal motion of the remaining walls, trace mitral regurgitation, trace tricuspid regurgitation with estimated PASP of 25 mmHg and physiologic pericardial effusion    I last saw her 1 year ago  She is doing well at that time  Blood pressure is elevated today 160/86    Review of Systems   Constitutional: Negative for chills and fever  HENT: Negative for facial swelling and sore throat  Eyes: Negative for visual disturbance  Respiratory: Negative for cough, chest tightness, shortness of breath and wheezing  Cardiovascular: Negative for chest pain, palpitations and leg swelling  Gastrointestinal: Negative for abdominal pain, blood in stool, constipation, diarrhea, nausea and vomiting  Endocrine: Negative for cold intolerance and heat intolerance  Genitourinary: Negative for decreased urine volume, difficulty urinating, dysuria and hematuria     Musculoskeletal: Negative for arthralgias, back pain and myalgias  Skin: Negative for rash  Neurological: Negative for dizziness, syncope, weakness and numbness  Psychiatric/Behavioral: Negative for agitation, behavioral problems and confusion  The patient is not nervous/anxious  OBJECTIVE  Vitals:    02/18/22 0910   BP: 160/86   Pulse: 58       Physical Exam  Constitutional: awake, alert and oriented, in no acute distress, no obvious deformities  Head: Normocephalic, without obvious abnormality, atraumatic  Eyes: conjunctivae clear and moist  Sclera anicteric  No xanthelasmas  Pupils equal bilaterally  Extraocular motions are full  Ear nose mouth and throat: ears are symmetrical bilaterally, hearing appears to be equal bilaterally, no nasal discharge or epistaxis, oropharynx is clear with moist mucous membranes  Neck:  Trachea is midline, neck is supple, no thyromegaly or significant lymphadenopathy, there is full range of motion  Lungs: clear to auscultation bilaterally, no wheezes, no rales, no rhonchi, no accessory muscle use, breathing is nonlabored  Heart: regular rate and rhythm, S1, S2 normal, no murmur, no click, rub or gallop, no lower extremity edema  Abdomen: soft, non-tender; bowel sounds normal; no masses,  no organomegaly  Psychiatric:  Patient is oriented to time, place, person, mood/affect is negative for depression, anxiety, agitation, appears to have appropriate insight  Skin: Skin is warm, dry, intact  No obvious rashes or lesions on exposed extremities  Nail beds are pink with no cyanosis or clubbing  EKG:  No results found for this visit on 02/18/22  IMPRESSION:  1  Coronary artery disease status post PCI with drug-eluting stents to the LAD and circumflex in 2007 in the setting of myocardial infarction   Sounds like this was a non ST segment myocardial infarction  2  Preserved ejection fraction by echo with mild valvular disease  3  Mild MR, mild TR  4  Dyslipidemia  5   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, resolved status post resection  6  HTN    DISCUSSION/RECOMMENDATIONS:   Overall from a heart standpoint she appears to be doing relatively well with the exception of her blood pressure has been increased as of recently  She does note that she has gained some weight and her pressure today is 160/86  She states at home when she checks it from time to time she has noticed systolic blood pressures in the 150 range as well   Given her cardiac history, and her echo several years ago which did show mild left ventricular hypertrophy, would be more aggressive with blood pressure management at this time  She is currently only on metoprolol at a very low dose which is actually considered 4th line therapy for hypertension alone  Would continue with the metoprolol for her coronary artery disease history however add amlodipine today as a calcium channel blocker  Discussed weight loss sodium intake with her today  I am hopeful that if she does lose weight and does reduce the amount of sodium in her diet she may be able to come off of the amlodipine in the future   Her ECG today does show a anteroseptal infarct pattern  She does have history of PCI to the LAD in the setting of myocardial infarction and with echo evidence of septal dyskinesis, this is all consistent with her history of CAD  Would continue with aspirin 81, Lipitor 40 a metoprolol for this as well as fish oil  I reviewed her lipid panel, she is very well controlled regarding this, no changes need to be made to her cholesterol therapy  Amina Garcia DO, FACC  --------------------------------------------------------------------------------  TREADMILL STRESS  No results found for this or any previous visit      ----------------------------------------------------------------------------------------------  NUCLEAR STRESS TEST: No results found for this or any previous visit      No results found for this or any previous visit       --------------------------------------------------------------------------------  CATH:  No results found for this or any previous visit     --------------------------------------------------------------------------------  ECHO:   Results for orders placed during the hospital encounter of 20    Echo complete with contrast if indicated    [facility]  [place]  [address]    Transthoracic Echocardiogram  2D, M-mode, Doppler, and Color Doppler    Study date:  2020    Patient: Iza Mcknight  MR number: [id number]  Account number: [de-identified]  : 1960  Age: 63 years  Gender: Female  Status: Outpatient  Location: [facility]  Height: 62 in  Weight: 157 7 lb  BP: 120/ 62 mmHg    Indications: Murmur    Diagnoses: R01 1 - Cardiac murmur, unspecified    Sonographer:  Nasim Castaneda  Interpreting Physician:  Aristides Masters DO  Primary Physician:  BARBARA Chilel  Referring Physician:  BARBARA Chilel  Group:  [facility]    SUMMARY    SUMMARY:  1  This is a technically adequate study  2  Left ventricle is mildly dilated with normal systolic function  Left ventricular ejection fraction is estimated at 56%  3  Mild concentric left ventricular hypertrophy  4  Probable grade 1 diastolic dysfunction  5  Apical septal dyskinesis is noted with normal motion of the remaining walls  6  Mild left atrial dilatation  7  Aortic valve is sclerotic with adequate separation  8  Trace mitral regurgitation  9  Trace tricuspid regurgitation with pulmonary artery systolic pressure is estimated at 25 mm Hg  10  Aortic root appears sclerotic  11  Physiologic pericardial effusion without echocardiographic indications of tamponade physiology  12   There is no study for comparison    HISTORY: PRIOR HISTORY: CAD, Dyslipidemia, S/P PCI Risk factors: a former history of cigarette use (quitting more than one month ago)     PROCEDURE: The study was performed in the [address] [place] [address]  This was a routine study  The transthoracic approach was used  The study included complete 2D imaging, M-mode, complete spectral Doppler, and color Doppler  The heart rate was 58  bpm, at the start of the study  Image quality was adequate  LEFT VENTRICLE: Left ventricle is mildly dilated with normal systolic function  Left ventricular ejection fraction is estimated at 56%  Mild concentric left ventricular hypertrophy  Probable grade 1 diastolic dysfunction  There appears to  be apical dyskinesis noted at the apical septal wall noted best in the apical four-chamber view  RIGHT VENTRICLE: Right ventricle is normal in size and function  LEFT ATRIUM: Left atrium is mildly dilated  ATRIAL SEPTUM: The interatrial septum appears to be grossly normal without evidence of shunting by color-flow Doppler  RIGHT ATRIUM: Right atrium is normal in size  MITRAL VALVE: Mitral valve opens well  No evidence of mitral stenosis  Trace mitral regurgitation  AORTIC VALVE: Aortic valve sclerotic with adequate separation  Aortic valve is trileaflet  No evidence of aortic stenosis or aortic regurgitation  TRICUSPID VALVE: Tricuspid valve opens well  Trace tricuspid regurgitation  Pulmonary artery systolic pressures are estimated at 25 mm Hg    PULMONIC VALVE: Pulmonic valve not well visualized  No evidence of pulmonic stenosis  Trace pulmonic regurgitation    PERICARDIUM: Physiologic pericardial effusion without echocardiographic indications of tamponade physiology    AORTA: Aortic root is sclerotic, but appears normal in size  SYSTEMIC VEINS: IVC: The IVC is normal size with collapse      SYSTEM MEASUREMENT TABLES    2D  %FS: 35 62 %  Ao Diam: 3 06 cm  EDV(Teich): 147 81 ml  EF(Teich): 64 51 %  ESV(Teich): 52 46 ml  IVSd: 1 09 cm  LA Area: 13 49 cm2  LA Diam: 4 49 cm  LVEDV MOD A4C: 145 65 ml  LVEF MOD A4C: 56 5 %  LVESV MOD A4C: 63 36 ml  LVIDd: 5 51 cm  LVIDs: 3 55 cm  LVLd A4C: 8 55 cm  LVLs A4C: 7 09 cm  LVPWd: 1 07 cm  RA Area: 12 16 cm2  RVIDd: 3 83 cm  SV MOD A4C: 82 29 ml  SV(Teich): 95 35 ml    CW  TR Vmax: 2 33 m/s  TR maxP 79 mmHg    MM  TAPSE: 2 94 cm    PW  E' Sept: 0 08 m/s  E/E' Sept: 12 01  MV A Humberto: 0 87 m/s  MV Dec Caswell: 3 7 m/s2  MV DecT: 264 37 ms  MV E Humberto: 0 98 m/s  MV E/A Ratio: 1 12  MV PHT: 76 67 ms  MVA By PHT: 2 87 cm2    [facility] Accredited Echocardiography Laboratory    Prepared and electronically signed by    Minerva Aguilar DO  Signed [id number] 09:51:07    No results found for this or any previous visit     --------------------------------------------------------------------------------  HOLTER  No results found for this or any previous visit  No results found for this or any previous visit     --------------------------------------------------------------------------------  CAROTIDS  No results found for this or any previous visit      --------------------------------------------------------------------------------  Diagnoses and all orders for this visit:    H/O heart artery stent  -     POCT ECG  -     metoprolol succinate (TOPROL-XL) 25 mg 24 hr tablet;  Take 1 tablet (25 mg total) by mouth daily       ======================================================    Past Medical History:   Diagnosis Date    Carcinoid tumor of lung     Carcinoid lung neoplasm - stage 1 w resection ; last assessed - 2013    Ovarian cancer ([facility] [address] ) [id number]    Right stage 1; [id number]    Thrombophlebitis of arm, left     Thrombophlebitis left; last assessed - [id number]     Past Surgical History:   Procedure Laterality Date    BREAST BIOPSY Right     Biopsy Breast Percutaneous needle core    BREAST BIOPSY Left         CLOSED REDUCTION WRIST FRACTURE Left 2011    COLONOSCOPY  2009    Complete Colonoscopy    CORONARY ANGIOPLASTY WITH STENT PLACEMENT      HYSTERECTOMY      age 51    [facility] RLL w/early stage carcinoid tumor; Resolved - Approx [id number]    TOTAL ABDOMINAL HYSTERECTOMY W/ BILATERAL SALPINGOOPHORECTOMY           Medications  Current Outpatient Medications   Medication Sig Dispense Refill    aspirin 81 MG tablet Take 1 tablet by mouth daily      atorvastatin (LIPITOR) 40 mg tablet Take 1 tablet (40 mg total) by mouth daily 90 tablet 3    Cholecalciferol (VITAMIN D3) 1000 units CAPS Take 1 capsule by mouth daily      Magnesium 250 MG TABS Take 1 tablet by mouth Taking 400 mg daily      metoprolol succinate (TOPROL-XL) 25 mg 24 hr tablet Take 1 tablet (25 mg total) by mouth daily 90 tablet 3    Omega-3 Fatty Acids (FISH OIL) 1200 MG CAPS Take 2 capsules by mouth daily        vitamin E, tocopherol, 400 units capsule Take 400 Units by mouth daily       No current facility-administered medications for this visit  Allergies   Allergen Reactions    Contrast  [Iodinated Diagnostic Agents] Hives    Iodine - Food Allergy      Other reaction(s):  Other (See Comments)  Contrast dye    Niacin      Annotation - [id number]: felt poor w med       Social History     Socioeconomic History    Marital status:      Spouse name: Not on file    Number of children: Not on file    Years of education: Not on file    Highest education level: Not on file   Occupational History    Occupation: [employer] work - Full-Time   Tobacco Use    Smoking status: Former Smoker     Quit date: 1/1/2007     Years since quitting: 15 1    Smokeless tobacco: Never Used   Substance and Sexual Activity    Alcohol use: Yes     Comment: minimum alcohol consumption    Drug use: Never    Sexual activity: Not Currently   Other Topics Concern    Not on file   Social History Narrative    Not on file     Social Determinants of Health     Financial Resource Strain: Not on file   Food Insecurity: Not on file   Transportation Needs: Not on file   Physical Activity: Not on file   Stress: Not on file   Social Connections: Not on file   Intimate Partner Violence: Not on file   Housing Stability: Not on file        Family History   Problem Relation Age of Onset    Breast cancer Mother 76    Diabetes Mother         Diabetes Mellitus    No Known Problems Sister     No Known Problems Daughter     Breast cancer Sister 48    No Known Problems Sister     Breast cancer Sister     No Known Problems Father     No Known Problems Maternal Grandmother     No Known Problems Maternal Grandfather     No Known Problems Paternal Grandmother     No Known Problems Paternal Grandfather        Lab Results   Component Value Date    WBC 3 74 (L) 08/18/2021    HGB 12 6 08/18/2021    HCT 37 9 08/18/2021    MCV 89 08/18/2021     08/18/2021      Lab Results   Component Value Date    SODIUM 145 08/18/2021    K 4 4 08/18/2021     (H) 08/18/2021    CO2 22 08/18/2021    BUN 26 (H) 08/18/2021    CREATININE 0 84 08/18/2021    GLUC 104 11/05/2016    CALCIUM 8 8 08/18/2021      No results found for: HGBA1C   Lab Results   Component Value Date    CHOL 124 07/01/2015    CHOL 117 02/12/2014     Lab Results   Component Value Date    HDL 58 08/18/2021    HDL 54 07/31/2020    HDL 65 (H) 06/12/2019     Lab Results   Component Value Date    LDLCALC 71 08/18/2021    LDLCALC 72 07/31/2020    LDLCALC 68 06/12/2019     Lab Results   Component Value Date    TRIG 22 08/18/2021    TRIG 57 07/31/2020    TRIG 25 06/12/2019     No results found for: CHOLHDL   No results found for: INR, PROTIME       Patient Active Problem List    Diagnosis Date Noted    Right Achilles tendinitis 08/12/2021    Former smoker -  Quit 2007 07/30/2020    H/O heart artery stent 05/12/2018    History of myocardial infarction 05/12/2018    Carcinoid tumor of lung w resection 2010 05/12/2018    S/P SEKOU-BSO 05/12/2018    Carotid stenosis 07/23/2015    History of vertigo 05/27/2015    Tinnitus 05/27/2015    Venous (peripheral) insufficiency 01/07/2014    Coronary arteriosclerosis 05/20/2012    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, s/p SEKOU/ BSO  12/01/2009       Portions of the record may have been created with voice recognition software  Occasional wrong word or "sound a like" substitutions may have occurred due to the inherent limitations of voice recognition software  Read the chart carefully and recognize, using context, where substitutions have occurred      Ed Dickerson DO, [facility] [place]  2/18/2022 9:23 AM

## 2022-02-23 ENCOUNTER — TELEPHONE (OUTPATIENT)
Dept: FAMILY MEDICINE CLINIC | Facility: CLINIC | Age: 62
End: 2022-02-23

## 2022-02-23 DIAGNOSIS — I25.2 HISTORY OF MYOCARDIAL INFARCTION: ICD-10-CM

## 2022-02-23 DIAGNOSIS — I25.10 CORONARY ARTERIOSCLEROSIS: ICD-10-CM

## 2022-02-23 RX ORDER — ATORVASTATIN CALCIUM 40 MG/1
40 TABLET, FILM COATED ORAL DAILY
Qty: 90 TABLET | Refills: 3 | Status: SHIPPED | OUTPATIENT
Start: 2022-02-23 | End: 2022-03-21 | Stop reason: SDUPTHER

## 2022-03-21 DIAGNOSIS — I25.2 HISTORY OF MYOCARDIAL INFARCTION: ICD-10-CM

## 2022-03-21 DIAGNOSIS — I25.10 CORONARY ARTERIOSCLEROSIS: ICD-10-CM

## 2022-03-21 RX ORDER — ATORVASTATIN CALCIUM 40 MG/1
40 TABLET, FILM COATED ORAL DAILY
Qty: 90 TABLET | Refills: 3 | Status: SHIPPED | OUTPATIENT
Start: 2022-03-21

## 2022-06-07 ENCOUNTER — OFFICE VISIT (OUTPATIENT)
Dept: FAMILY MEDICINE CLINIC | Facility: CLINIC | Age: 62
End: 2022-06-07
Payer: COMMERCIAL

## 2022-06-07 VITALS
TEMPERATURE: 97.2 F | SYSTOLIC BLOOD PRESSURE: 136 MMHG | DIASTOLIC BLOOD PRESSURE: 82 MMHG | HEART RATE: 67 BPM | WEIGHT: 163 LBS | OXYGEN SATURATION: 99 % | BODY MASS INDEX: 30 KG/M2 | HEIGHT: 62 IN

## 2022-06-07 DIAGNOSIS — L02.211 ABSCESS OF SKIN OF ABDOMEN: Primary | ICD-10-CM

## 2022-06-07 PROCEDURE — 99214 OFFICE O/P EST MOD 30 MIN: CPT | Performed by: FAMILY MEDICINE

## 2022-06-07 RX ORDER — DOXYCYCLINE HYCLATE 100 MG/1
100 CAPSULE ORAL EVERY 12 HOURS SCHEDULED
Qty: 20 CAPSULE | Refills: 0 | Status: SHIPPED | OUTPATIENT
Start: 2022-06-07 | End: 2022-06-17

## 2022-06-07 NOTE — PROGRESS NOTES
FAMILY PRACTICE OFFICE VISIT    NAME: Yomaira Sharma    AGE: 57 y o  SEX: female  : 1960   MRN: [id number]    DATE: 2022  TIME: 12:54 PM    Assessment and Plan   1  Abscess of skin of abdomen  Refer to general surgery - asap - dr Dario Keenan for I & D  No evidence for gross systemic involvement    Please try to see pt wed or Thursday this week  Abscess upper mid abdomen  No personal h/o MRSA  Placed pt on po antibiotic - doxy - caution for photosensitivity; avoid taking mag while on antibiotic; eat ayogurt while taking  Recommend warm compresses and avoid irritants to area - including clothing and bra  Leave open to air as much as possible until seen by surgeon  Call if any worsening/monitor for fever - if anything worsens - to ED    No work until after I & D with surgery    If pt needs a note for work - she will let me know    Note  - pt with systolic murmur  Sees cardio and has upcoming apt  Known aortic sclerosis on echo         Chief Complaint     Chief Complaint   Patient presents with    Mass     Lump 2 in below in sternum area pushing or touching gives her pain noticed this past week        History of Present Illness   Yomaira Sharma is a 57y o -year-old female who presents today for evaluation of lump under sternum  Started about a week ago  Wears a supportive bra - and sometimes wears underwire bras      Review of Systems   Review of Systems   Constitutional: Negative for fever  Skin: Positive for wound  Some drainage - and looks like it could start draining  Area under breast bone    No h/o DM, MRSA or cellulitis    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       Active Problem List     Patient Active Problem List   Diagnosis    History of vertigo    Tinnitus    Venous (peripheral) insufficiency    Coronary arteriosclerosis    Carotid stenosis    H/O heart artery stent    History of myocardial infarction    Carcinoid tumor of lung w resection     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 [place] [address] ) [id number]    Right stage 1; [id number]    Thrombophlebitis of arm, left     Thrombophlebitis left; last assessed - [id number]       Past Surgical History:  Past Surgical History:   Procedure Laterality Date    BREAST BIOPSY Right     Biopsy Breast Percutaneous needle core    BREAST BIOPSY Left     2002    CLOSED REDUCTION WRIST FRACTURE Left 07/20/2011    COLONOSCOPY  05/2009    Complete Colonoscopy    CORONARY ANGIOPLASTY WITH STENT PLACEMENT      HYSTERECTOMY      age 51    LUNG LOBECTOMY      RLL w/early stage carcinoid tumor;  Resolved - Approx [id number]    TOTAL ABDOMINAL HYSTERECTOMY W/ BILATERAL SALPINGOOPHORECTOMY         Family History:  Family History   Problem Relation Age of Onset    Breast cancer Mother 76    Diabetes Mother         Diabetes Mellitus    No Known Problems Sister     No Known Problems Daughter     Breast cancer Sister 48    No Known Problems Sister     Breast cancer Sister     No Known Problems Father     No Known Problems Maternal Grandmother     No Known Problems Maternal Grandfather     No Known Problems Paternal Grandmother     No Known Problems Paternal Grandfather        Social History:  Social History     Socioeconomic History    Marital status:      Spouse name: Not on file    Number of children: Not on file    Years of education: Not on file    Highest education level: Not on file   Occupational History    Occupation: [employer] work - Full-Time   Tobacco Use    Smoking status: Former Smoker     Quit date: 1/1/2007     Years since quitting: 15 4    Smokeless tobacco: Never Used   Substance and Sexual Activity    Alcohol use: Yes     Comment: minimum alcohol consumption    Drug use: Never    Sexual activity: Not Currently   Other Topics Concern    Not on file   Social History Narrative    Not on file     Social Determinants of Health     Financial Resource Strain: Not on file   Food Insecurity: Not on file   Transportation Needs: Not on file   Physical Activity: Not on file   Stress: Not on file   Social Connections: Not on file   Intimate Partner Violence: Not on file   Housing Stability: Not on file       Objective     Vitals:    06/07/22 1246   BP: 136/82   Pulse: 67   Temp: (!) 97 2 °F (36 2 °C)   SpO2: 99%     Wt Readings from Last 3 Encounters:   06/07/22 73 9 kg (163 lb)   02/18/22 72 7 kg (160 lb 3 2 oz)   08/12/21 68 kg (150 lb)       Physical Exam  Vitals and nursing note reviewed  Constitutional:       General: She is not in acute distress  Appearance: Normal appearance  She is not ill-appearing or toxic-appearing  Comments: Nontoxic     Cardiovascular:      Rate and Rhythm: Normal rate and regular rhythm  Heart sounds: Murmur heard  Comments: Systolic murmur - nonradiating at right 2nd intercostal space  C/w known aortic sclerosis  Pulmonary:      Effort: Pulmonary effort is normal  No respiratory distress  Breath sounds: Normal breath sounds  No wheezing, rhonchi or rales  Skin:     Comments: Indurated erythematous area around epigastric region  nontender  Induration extends under skin wider than erythematous region  Appears to have opened and scabbed over - but no active drainage available for culture  A couple of reddened areas along the right side of lesion - suspect from previous bandaid use  Neurological:      General: No focal deficit present  Mental Status: She is alert and oriented to person, place, and time  Psychiatric:         Mood and Affect: Mood normal          Behavior: Behavior normal          Thought Content:  Thought content normal          Judgment: Judgment normal          Pertinent Laboratory/Diagnostic Studies:  Lab Results   Component Value Date    GLUCOSE 89 07/01/2015    BUN 26 (H) 08/18/2021    CREATININE 0 84 08/18/2021    CALCIUM 8 8 08/18/2021     07/01/2015    K 4 4 08/18/2021    CO2 22 08/18/2021     (H) 08/18/2021     Lab Results   Component Value Date    ALT 37 08/18/2021    AST 24 08/18/2021    ALKPHOS 53 08/18/2021    BILITOT 1 0 07/01/2015       Lab Results   Component Value Date    WBC 3 74 (L) 08/18/2021    HGB 12 6 08/18/2021    HCT 37 9 08/18/2021    MCV 89 08/18/2021     08/18/2021       No results found for: TSH    Lab Results   Component Value Date    CHOL 124 07/01/2015     Lab Results   Component Value Date    TRIG 22 08/18/2021     Lab Results   Component Value Date    HDL 58 08/18/2021     Lab Results   Component Value Date    LDLCALC 71 08/18/2021     No results found for: HGBA1C    Results for orders placed or performed in visit on 09/13/21   Cologuard   Result Value Ref Range    Cologuard Result NEGATIVE        No orders of the defined types were placed in this encounter  ALLERGIES:  Allergies   Allergen Reactions    Contrast  [Iodinated Diagnostic Agents] Hives    Iodine - Food Allergy      Other reaction(s):  Other (See Comments)  Contrast dye    Niacin      Annotation - [id number]: felt poor w med       Current Medications     Current Outpatient Medications   Medication Sig Dispense Refill    amLODIPine (NORVASC) 5 mg tablet Take 1 tablet (5 mg total) by mouth daily 90 tablet 3    aspirin 81 MG tablet Take 1 tablet by mouth daily      atorvastatin (LIPITOR) 40 mg tablet Take 1 tablet (40 mg total) by mouth daily 90 tablet 3    Cholecalciferol (VITAMIN D3) 1000 units CAPS Take 1 capsule by mouth daily      Magnesium 250 MG TABS Take 1 tablet by mouth Taking 400 mg daily      metoprolol succinate (TOPROL-XL) 25 mg 24 hr tablet Take 1 tablet (25 mg total) by mouth daily 90 tablet 3    Omega-3 Fatty Acids (FISH OIL) 1200 MG CAPS Take 2 capsules by mouth daily        vitamin E, tocopherol, 400 units capsule Take 400 Units by mouth daily       No current facility-administered medications for this visit           Health Maintenance     Health Maintenance   Topic Date Due    DTaP,Tdap,and Td Vaccines (1 - Tdap) Never done    Osteoporosis Screening  Never done    COVID-19 Vaccine (3 - Booster for Moderna series) 10/14/2021    Breast Cancer Screening: Mammogram  04/09/2022    Depression Screening  08/12/2022    BMI: Followup Plan  08/12/2022    Annual Physical  08/12/2022    Influenza Vaccine (Season Ended) 09/01/2022    HIV Screening  08/12/2023 (Originally 10/17/1975)    BMI: Adult  02/18/2023    Colorectal Cancer Screening  09/13/2024    Hepatitis C Screening  Completed    Pneumococcal Vaccine: Pediatrics (0 to 5 Years) and At-Risk Patients (6 to 59 Years)  Aged Out    HIB Vaccine  Aged Out    Hepatitis B Vaccine  Aged Out    IPV Vaccine  Aged Out    Hepatitis A Vaccine  Aged Out    Meningococcal ACWY Vaccine  Aged Out    HPV Vaccine  Aged [employer] History   Administered Date(s) Administered    COVID-19 MODERNA VACC 0 5 ML IM 04/14/2021, 05/14/2021    Influenza Quadrivalent, 6-35 Months IM 11/04/2016    Influenza, recombinant, quadrivalent,injectable, preservative free 01/28/2020    Influenza, seasonal, injectable 12/10/2012    Pneumococcal Polysaccharide PPV23 06/11/2019          Vannessa Ruiz DO

## 2022-06-09 ENCOUNTER — CONSULT (OUTPATIENT)
Dept: SURGERY | Facility: CLINIC | Age: 62
End: 2022-06-09
Payer: COMMERCIAL

## 2022-06-09 VITALS
SYSTOLIC BLOOD PRESSURE: 120 MMHG | TEMPERATURE: 96.9 F | HEART RATE: 89 BPM | HEIGHT: 62 IN | WEIGHT: 163.6 LBS | DIASTOLIC BLOOD PRESSURE: 60 MMHG | BODY MASS INDEX: 30.11 KG/M2

## 2022-06-09 DIAGNOSIS — L72.3 SEBACEOUS CYST: Primary | ICD-10-CM

## 2022-06-09 DIAGNOSIS — L02.211 ABSCESS OF SKIN OF ABDOMEN: ICD-10-CM

## 2022-06-09 DIAGNOSIS — L02.211 ABSCESS OF SKIN OF ABDOMEN: Primary | ICD-10-CM

## 2022-06-09 PROCEDURE — 87070 CULTURE OTHR SPECIMN AEROBIC: CPT | Performed by: SURGERY

## 2022-06-09 PROCEDURE — 87186 SC STD MICRODIL/AGAR DIL: CPT | Performed by: SURGERY

## 2022-06-09 PROCEDURE — 10060 I&D ABSCESS SIMPLE/SINGLE: CPT | Performed by: PHYSICIAN ASSISTANT

## 2022-06-09 PROCEDURE — 88304 TISSUE EXAM BY PATHOLOGIST: CPT | Performed by: PATHOLOGY

## 2022-06-09 PROCEDURE — 99203 OFFICE O/P NEW LOW 30 MIN: CPT | Performed by: PHYSICIAN ASSISTANT

## 2022-06-09 PROCEDURE — 87077 CULTURE AEROBIC IDENTIFY: CPT | Performed by: SURGERY

## 2022-06-09 PROCEDURE — 87205 SMEAR GRAM STAIN: CPT | Performed by: SURGERY

## 2022-06-09 NOTE — PROGRESS NOTES
Apryl Root    Procedure:  Excisional debridement soft tissue  SITE:  Midline upper abdominal wall    The area of infection as listed above was identified and marked  Confirmation of the patient's allergies was carried out  Patient was not allergic to local anesthesia  Written and verbal consent were obtained  A full time-out was carried out for this procedure  The area was prepped and draped in a sterile fashion  Local anesthesia:  Lidocaine,  2%,      with Epinephrine was used  Approximately:   2 cc were used  Using : scapel, the area of infection was excisionally debrided  Cultures were sent: yes    Tissue:  Skin and soft tissue were removed with the specimen  Pathology sent: yes - cyst wall was removed     Loculations were broken up using finger blunt dissection and instrument dissection  The wound was irrigated with sterile saline  The wound was packed with:  So small not needed  The wound was dressed with guaze and tape  The patient tolerated procedure well  There was minimal blood loss  There were no complications  Wound care and postoperative instructions were discussed and written instructions also given          Panfilo Talbot PA-C

## 2022-06-09 NOTE — PROGRESS NOTES
Assessment/Plan:   Britany Chatterjee is a 57 y  o female who is here for   Chief Complaint   Patient presents with    Abscess     2 inches under breast patient states it is painful if she presses on it and there is some redness      On exam found to have abscess of the :  midline abdominal wall  Plan: I and D in the office  Culture Taken  Pathology sent as a cyst wall was removed with the I and D      _______________________________________________________  CC:Abscess (2 inches under breast patient states it is painful if she presses on it and there is some redness )    HPI:  Britany Chatterjee is a 57 y  o female who was referred for evaluation of Abscess (2 inches under breast patient states it is painful if she presses on it and there is some redness )    Patient was seen by PCP two days ago and started on doxycycline  Currently patient reports The area has "come to a head" and is sore to the touch but has not worsened since starting antibiotics  There was minimal drainage yuesterday  She denies fevers, nausea, vomiting, chills  ROS:  General ROS: negative  negative for - chills, fatigue, fever or night sweats, weight loss  Respiratory ROS: no cough, shortness of breath, or wheezing  Cardiovascular ROS: no chest pain or dyspnea on exertion  Genito-Urinary ROS: no dysuria, trouble voiding, or hematuria  Musculoskeletal ROS: negative for - gait disturbance, joint pain or muscle pain  Neurological ROS: no TIA or stroke symptoms  Skin ROS: See HPI  GI ROS: see HPI  Skin ROS: no new rashes or lesions   Lymphatic ROS: no new adenopathy noted by pt     Psy ROS: no new mental or behavioral disturbances       Patient Active Problem List   Diagnosis    History of vertigo    Tinnitus    Venous (peripheral) insufficiency    Coronary arteriosclerosis    Carotid stenosis    H/O heart artery stent    History of myocardial infarction    Carcinoid tumor of lung w resection 2010    Malignant neoplasm of right ovary - Stage 1, s/p SEKOU/ BSO     S/P SEKOU-BSO    Former smoker -  Quit 2007    Right Achilles tendinitis         Allergies:  Contrast  [iodinated diagnostic agents], Iodine - food allergy, and Niacin      Current Outpatient Medications:     amLODIPine (NORVASC) 5 mg tablet, Take 1 tablet (5 mg total) by mouth daily, Disp: 90 tablet, Rfl: 3    aspirin 81 MG tablet, Take 1 tablet by mouth daily, Disp: , Rfl:     atorvastatin (LIPITOR) 40 mg tablet, Take 1 tablet (40 mg total) by mouth daily, Disp: 90 tablet, Rfl: 3    Cholecalciferol (VITAMIN D3) 1000 units CAPS, Take 1 capsule by mouth daily, Disp: , Rfl:     doxycycline hyclate (VIBRAMYCIN) 100 mg capsule, Take 1 capsule (100 mg total) by mouth every 12 (twelve) hours for 10 days caution for photosensitivity; eat a yogurt while taking; do not take magnesium while on antibiotic, Disp: 20 capsule, Rfl: 0    metoprolol succinate (TOPROL-XL) 25 mg 24 hr tablet, Take 1 tablet (25 mg total) by mouth daily, Disp: 90 tablet, Rfl: 3    Omega-3 Fatty Acids (FISH OIL) 1200 MG CAPS, Take 2 capsules by mouth daily  , Disp: , Rfl:     vitamin E, tocopherol, 400 units capsule, Take 400 Units by mouth daily, Disp: , Rfl:     Magnesium 250 MG TABS, Take 1 tablet by mouth Taking 400 mg daily (Patient not taking: Reported on 6/9/2022), Disp: , Rfl:     Past Medical History:   Diagnosis Date    Carcinoid tumor of lung 2009    Carcinoid lung neoplasm - stage 1 w resection 2010; last assessed - 17Jun2013    Ovarian cancer ([facility] [address] ) [id number]    Right stage 1; [id number]    Thrombophlebitis of arm, left     Thrombophlebitis left; last assessed - [id number]       Past Surgical History:   Procedure Laterality Date    BREAST BIOPSY Right     Biopsy Breast Percutaneous needle core    BREAST BIOPSY Left     2002    CLOSED REDUCTION WRIST FRACTURE Left 07/20/2011    COLONOSCOPY  05/2009    Complete Colonoscopy    CORONARY ANGIOPLASTY WITH STENT PLACEMENT      HYSTERECTOMY      age 53    LUNG LOBECTOMY      RLL w/early stage carcinoid tumor; Resolved - Approx [id number]    TOTAL ABDOMINAL HYSTERECTOMY W/ BILATERAL SALPINGOOPHORECTOMY         Family History   Problem Relation Age of Onset    Breast cancer Mother 76    Diabetes Mother         Diabetes Mellitus    No Known Problems Sister     No Known Problems Daughter     Breast cancer Sister 48    No Known Problems Sister     Breast cancer Sister     No Known Problems Father     No Known Problems Maternal Grandmother     No Known Problems Maternal Grandfather     No Known Problems Paternal Grandmother     No Known Problems Paternal Grandfather         reports that she quit smoking about 15 years ago  She has never used smokeless tobacco  She reports current alcohol use  She reports that she does not use drugs  Vitals:    06/09/22 1114   BP: 120/60   Pulse: 89   Temp: (!) 96 9 °F (36 1 °C)        PHYSICAL EXAM  General Appearance:    Alert, cooperative, no distress,    Head:    Normocephalic without obvious abnormality   Eyes:    PERRL, conjunctiva/corneas clear     Neck:   Supple, no adenopathy, no JVD   Back:      Lungs:      Heart:     Abdomen:     Benign, no rebound or guarding  Extremities:   Extremities normal  No clubbing, cyanosis or edema   Psych:   Normal Affect, AOx3  Neurologic:  Skin:   CNII-XII intact  Strength symmetric, speech intact    Warm, dry, intact, no visible rashes or lesions except as follows: Midline upper abdomen there is a 1 cm abscess with a middle purulent head with surrounding erythema and induration  Tenderness to palpation              Corey Clemons PA-C  Date: 6/9/2022 Time: 12:12 PM

## 2022-06-12 LAB
BACTERIA WND AEROBE CULT: ABNORMAL
GRAM STN SPEC: ABNORMAL

## 2022-06-24 ENCOUNTER — OFFICE VISIT (OUTPATIENT)
Dept: CARDIOLOGY CLINIC | Facility: CLINIC | Age: 62
End: 2022-06-24
Payer: COMMERCIAL

## 2022-06-24 VITALS
DIASTOLIC BLOOD PRESSURE: 74 MMHG | BODY MASS INDEX: 29.85 KG/M2 | HEART RATE: 56 BPM | SYSTOLIC BLOOD PRESSURE: 130 MMHG | HEIGHT: 62 IN | WEIGHT: 162.2 LBS

## 2022-06-24 DIAGNOSIS — Z95.5 H/O HEART ARTERY STENT: Primary | ICD-10-CM

## 2022-06-24 DIAGNOSIS — I25.10 CORONARY ARTERIOSCLEROSIS: ICD-10-CM

## 2022-06-24 DIAGNOSIS — Z87.891 FORMER SMOKER: ICD-10-CM

## 2022-06-24 DIAGNOSIS — I25.2 HISTORY OF MYOCARDIAL INFARCTION: ICD-10-CM

## 2022-06-24 PROCEDURE — 1036F TOBACCO NON-USER: CPT

## 2022-06-24 PROCEDURE — 3008F BODY MASS INDEX DOCD: CPT

## 2022-06-24 PROCEDURE — 99214 OFFICE O/P EST MOD 30 MIN: CPT

## 2022-06-24 NOTE — PROGRESS NOTES
Cardiology   MD Lisa Guzman MD Charlanne Elms, DO, [facility]  MD Grazyna Underwood DO, Solo Nieves DO, [facility]  -------------------------------------------------------------------  [facility]  [address]  [place], [place] [address]  [phone]  [phone]  06/24/22  David Abbasi  YOB: 1960   MRN: [id number]      Referring Physician: Kaelyn Fleming DO  [address]  [address] [place], [place] [address]     HPI: David Abbasi is a 57 y o  female with: coronary artery disease status post PCI to the LAD and circumflex arteries 2007, 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, dyslipidemia, former smoker, quit in 2007, mild MR and TR on echocardiogram with ejection fraction 55% 2015 who presents today for re-evaluation  Isael Hernandez was initially lost to follow-up with Cardiology over the years since her stents were placed       Echo 11/2020 shows EF 56% mild LVH grade 1 diastolic dysfunction, apical septal dyskinesis is noted with normal motion of the remaining walls, trace mitral regurgitation, trace tricuspid regurgitation with estimated PASP of 25 mmHg and physiologic pericardial effusion    Her blood pressure was elevated at her last office visit with me  Added amlodipine at that time and discussed sodium intake  Blood pressure is much improved today 130/74  She is on aspirin Lipitor metoprolol and fish oil for CAD    She feels well at this time, no chest pain or shortness of breath    Review of Systems   Constitutional: Negative for chills and fever  HENT: Negative for facial swelling and sore throat  Eyes: Negative for visual disturbance  Respiratory: Negative for cough, chest tightness, shortness of breath and wheezing  Cardiovascular: Negative for chest pain, palpitations and leg swelling     Gastrointestinal: Negative for abdominal pain, blood in stool, constipation, diarrhea, nausea and vomiting  Endocrine: Negative for cold intolerance and heat intolerance  Genitourinary: Negative for decreased urine volume, difficulty urinating, dysuria and hematuria  Musculoskeletal: Negative for arthralgias, back pain and myalgias  Skin: Negative for rash  Neurological: Negative for dizziness, syncope, weakness and numbness  Psychiatric/Behavioral: Negative for agitation, behavioral problems and confusion  The patient is not nervous/anxious  OBJECTIVE  Vitals:    06/24/22 0812   BP: 130/74   Pulse: 56       Physical Exam  Constitutional: awake, alert and oriented, in no acute distress, no obvious deformities  Head: Normocephalic, without obvious abnormality, atraumatic  Eyes: conjunctivae clear and moist  Sclera anicteric  No xanthelasmas  Pupils equal bilaterally  Extraocular motions are full  Ear nose mouth and throat: ears are symmetrical bilaterally, hearing appears to be equal bilaterally, no nasal discharge or epistaxis, oropharynx is clear with moist mucous membranes  Neck:  Trachea is midline, neck is supple, no thyromegaly or significant lymphadenopathy, there is full range of motion  Lungs: clear to auscultation bilaterally, no wheezes, no rales, no rhonchi, no accessory muscle use, breathing is nonlabored  Heart: regular rate and rhythm, S1, S2 normal, 4-0/9 systolic murmur RUSB with radiation to B/L carotids, no click, no rub and no gallop, no lower extremity edema  Abdomen: soft, non-tender; bowel sounds normal; no masses,  no organomegaly  Psychiatric:  Patient is oriented to time, place, person, mood/affect is negative for depression, anxiety, agitation, appears to have appropriate insight  Skin: Skin is warm, dry, intact  No obvious rashes or lesions on exposed extremities  Nail beds are pink with no cyanosis or clubbing  EKG:  No results found for this visit on 06/24/22       IMPRESSION:  Coronary artery disease status post PCI with drug-eluting stents to the LAD and circumflex in 2007 in the setting of myocardial infarction   Sounds like this was a non ST segment myocardial infarction  Preserved ejection fraction by echo with mild valvular disease  Mild MR, mild TR  Dyslipidemia  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, resolved status post resection  HTN  Cardiac murmur RUSB with radiation to carotids - no sig AoV disease on echo 2020      DISCUSSION/RECOMMENDATIONS:  She is doing very well from cardiovascular standpoint  She has no angina type symptoms  Blood pressure is very well controlled now  Cholesterol is very well controlled, she gets new blood work every August when she follows up with her PCP  Today she does have a murmur at the right upper sternal border, no significant aortic valve disease was seen on echo 2020 and she is asymptomatic at this time  Would plan to repeat echo in November of 2023 in the absence of new or developing symptoms  For now, would continue her current cardiac medications at their current doses, amlodipine 5 mg, aspirin 81, Lipitor 40, Toprol XL 25, as well as fish oil  She will get repeat labs through her PCP at her visit in August  Otherwise from cardiovascular perspective she is stable at this time and could likely follow yearly    Leslie Correa DO, [facility]  --------------------------------------------------------------------------------  TREADMILL STRESS  No results found for this or any previous visit      ----------------------------------------------------------------------------------------------  NUCLEAR STRESS TEST: No results found for this or any previous visit      No results found for this or any previous visit       --------------------------------------------------------------------------------  CATH:  No results found for this or any previous visit     --------------------------------------------------------------------------------  ECHO:   Results for orders placed during the hospital encounter of 11/24/20    Echo complete with contrast if indicated    Narrative  [facility] [address]    Transthoracic Echocardiogram  2D, M-mode, Doppler, and Color Doppler    Study date:  2020    Patient: Savanna Sal  MR number: [id number]  Account number: [de-identified]  : 1960  Age: 63 years  Gender: Female  Status: Outpatient  Location: [facility]  Height: 62 in  Weight: 157 7 lb  BP: 120/ 62 mmHg    Indications: Murmur    Diagnoses: R01 1 - Cardiac murmur, unspecified    Sonographer:  Tamia Hernandez  Interpreting Physician:  Ana Maria Kurtz DO  Primary Physician:  BARBARA Aponte  Referring Physician:  BARBARA Aponte  Group:  [facility]    SUMMARY    SUMMARY:  1  This is a technically adequate study  2  Left ventricle is mildly dilated with normal systolic function  Left ventricular ejection fraction is estimated at 56%  3  Mild concentric left ventricular hypertrophy  4  Probable grade 1 diastolic dysfunction  5  Apical septal dyskinesis is noted with normal motion of the remaining walls  6  Mild left atrial dilatation  7  Aortic valve is sclerotic with adequate separation  8  Trace mitral regurgitation  9  Trace tricuspid regurgitation with pulmonary artery systolic pressure is estimated at 25 mm Hg  10  Aortic root appears sclerotic  11  Physiologic pericardial effusion without echocardiographic indications of tamponade physiology  12  There is no study for comparison    HISTORY: PRIOR HISTORY: CAD, Dyslipidemia, S/P PCI Risk factors: a former history of cigarette use (quitting more than one month ago)  PROCEDURE: The study was performed in the [address] [place] [address] OP  This was a routine study  The transthoracic approach was used  The study included complete 2D imaging, M-mode, complete spectral Doppler, and color Doppler  The heart rate was 58  bpm, at the start of the study  Image quality was adequate      LEFT VENTRICLE: Left ventricle is mildly dilated with normal systolic function  Left ventricular ejection fraction is estimated at 56%  Mild concentric left ventricular hypertrophy  Probable grade 1 diastolic dysfunction  There appears to  be apical dyskinesis noted at the apical septal wall noted best in the apical four-chamber view  RIGHT VENTRICLE: Right ventricle is normal in size and function  LEFT ATRIUM: Left atrium is mildly dilated  ATRIAL SEPTUM: The interatrial septum appears to be grossly normal without evidence of shunting by color-flow Doppler  RIGHT ATRIUM: Right atrium is normal in size  MITRAL VALVE: Mitral valve opens well  No evidence of mitral stenosis  Trace mitral regurgitation  AORTIC VALVE: Aortic valve sclerotic with adequate separation  Aortic valve is trileaflet  No evidence of aortic stenosis or aortic regurgitation  TRICUSPID VALVE: Tricuspid valve opens well  Trace tricuspid regurgitation  Pulmonary artery systolic pressures are estimated at 25 mm Hg    PULMONIC VALVE: Pulmonic valve not well visualized  No evidence of pulmonic stenosis  Trace pulmonic regurgitation    PERICARDIUM: Physiologic pericardial effusion without echocardiographic indications of tamponade physiology    AORTA: Aortic root is sclerotic, but appears normal in size  SYSTEMIC VEINS: IVC: The IVC is normal size with collapse      SYSTEM MEASUREMENT TABLES    2D  %FS: 35 62 %  Ao Diam: 3 06 cm  EDV(Teich): 147 81 ml  EF(Teich): 64 51 %  ESV(Teich): 52 46 ml  IVSd: 1 09 cm  LA Area: 13 49 cm2  LA Diam: 4 49 cm  LVEDV MOD A4C: 145 65 ml  LVEF MOD A4C: 56 5 %  LVESV MOD A4C: 63 36 ml  LVIDd: 5 51 cm  LVIDs: 3 55 cm  LVLd A4C: 8 55 cm  LVLs A4C: 7 09 cm  LVPWd: 1 07 cm  RA Area: 12 16 cm2  RVIDd: 3 83 cm  SV MOD A4C: 82 29 ml  SV(Teich): 95 35 ml    CW  TR Vmax: 2 33 m/s  TR maxP 79 mmHg    MM  TAPSE: 2 94 cm    PW  E' Sept: 0 08 m/s  E/E' Sept: 12 01  MV A Humberto: 0 87 m/s  MV Dec [place]: 3 7 m/s2  MV DecT: 264 37 ms  MV E Humberto: 0 98 m/s  MV E/A Ratio: 1 12  MV PHT: 76 67 ms  MVA By PHT: 2 87 cm2    [facility]    Prepared and electronically signed by    Manuel Bangura DO  Signed [id number] 09:51:07    No results found for this or any previous visit     --------------------------------------------------------------------------------  HOLTER  No results found for this or any previous visit  No results found for this or any previous visit     --------------------------------------------------------------------------------  CAROTIDS  No results found for this or any previous visit      --------------------------------------------------------------------------------  Diagnoses and all orders for this visit:    H/O heart artery stent    History of myocardial infarction    Former smoker -  Quit 2007    Coronary arteriosclerosis     ======================================================    Past Medical History:   Diagnosis Date    Carcinoid tumor of lung 2009    Carcinoid lung neoplasm - stage 1 w resection 2010; last assessed - 17Jun2013    [facility] [id number]    Right stage 1; [id number]    Thrombophlebitis of arm, left     Thrombophlebitis left; last assessed - [id number]     Past Surgical History:   Procedure Laterality Date    BREAST BIOPSY Right     Biopsy Breast Percutaneous needle core    BREAST BIOPSY Left     2002    CLOSED REDUCTION WRIST FRACTURE Left 07/20/2011    COLONOSCOPY  05/2009    Complete Colonoscopy    CORONARY ANGIOPLASTY WITH STENT PLACEMENT      HYSTERECTOMY      age 51    LUNG LOBECTOMY      RLL w/early stage carcinoid tumor;  Resolved - Approx [id number]    TOTAL ABDOMINAL HYSTERECTOMY W/ BILATERAL SALPINGOOPHORECTOMY           Medications  Current Outpatient Medications   Medication Sig Dispense Refill    amLODIPine (NORVASC) 5 mg tablet Take 1 tablet (5 mg total) by mouth daily 90 tablet 3    aspirin 81 MG tablet Take 1 tablet by mouth daily  atorvastatin (LIPITOR) 40 mg tablet Take 1 tablet (40 mg total) by mouth daily 90 tablet 3    Cholecalciferol (VITAMIN D3) 1000 units CAPS Take 1 capsule by mouth daily      Magnesium 250 MG TABS Take 1 tablet by mouth Taking 400 mg daily      metoprolol succinate (TOPROL-XL) 25 mg 24 hr tablet Take 1 tablet (25 mg total) by mouth daily 90 tablet 3    Omega-3 Fatty Acids (FISH OIL) 1200 MG CAPS Take 2 capsules by mouth daily        vitamin E, tocopherol, 400 units capsule Take 400 Units by mouth daily (Patient not taking: Reported on 6/24/2022)       No current facility-administered medications for this visit  Allergies   Allergen Reactions    Contrast  [Iodinated Diagnostic Agents] Hives    Iodine - Food Allergy      Other reaction(s):  Other (See Comments)  Contrast dye    Niacin      [id number] felt poor w med       Social History     Socioeconomic History    Marital status:      Spouse name: Not on file    Number of children: Not on file    Years of education: Not on file    Highest education level: Not on file   Occupational History    Occupation: [employer] work - Full-Time   Tobacco Use    Smoking status: Former Smoker     Quit date: 1/1/2007     Years since quitting: 15 4    Smokeless tobacco: Never Used   Vaping Use    Vaping Use: Never used   Substance and Sexual Activity    Alcohol use: Yes     Comment: minimum alcohol consumption    Drug use: Never    Sexual activity: Not Currently   Other Topics Concern    Not on file   Social History Narrative    Not on file     Social Determinants of Health     Financial Resource Strain: Not on file   Food Insecurity: Not on file   Transportation Needs: Not on file   Physical Activity: Not on file   Stress: Not on file   Social Connections: Not on file   Intimate Partner Violence: Not on file   Housing Stability: Not on file        Family History   Problem Relation Age of Onset    Breast cancer Mother 76    Diabetes Mother         Diabetes Mellitus    No Known Problems Sister     No Known Problems Daughter     Breast cancer Sister 48    No Known Problems Sister     Breast cancer Sister     No Known Problems Father     No Known Problems Maternal Grandmother     No Known Problems Maternal Grandfather     No Known Problems Paternal Grandmother     No Known Problems Paternal Grandfather        Lab Results   Component Value Date    WBC 3 74 (L) 08/18/2021    HGB 12 6 08/18/2021    HCT 37 9 08/18/2021    MCV 89 08/18/2021     08/18/2021      Lab Results   Component Value Date    SODIUM 145 08/18/2021    K 4 4 08/18/2021     (H) 08/18/2021    CO2 22 08/18/2021    BUN 26 (H) 08/18/2021    CREATININE 0 84 08/18/2021    GLUC 104 11/05/2016    CALCIUM 8 8 08/18/2021      No results found for: HGBA1C   Lab Results   Component Value Date    CHOL 124 07/01/2015    CHOL 117 02/12/2014     Lab Results   Component Value Date    HDL 58 08/18/2021    HDL 54 07/31/2020    HDL 65 (H) 06/12/2019     Lab Results   Component Value Date    LDLCALC 71 08/18/2021    LDLCALC 72 07/31/2020    LDLCALC 68 06/12/2019     Lab Results   Component Value Date    TRIG 22 08/18/2021    TRIG 57 07/31/2020    TRIG 25 06/12/2019     No results found for: CHOLHDL   No results found for: INR, PROTIME       Patient Active Problem List    Diagnosis Date Noted    Right Achilles tendinitis 08/12/2021    Former smoker -  Quit 2007 07/30/2020    H/O heart artery stent 05/12/2018    History of myocardial infarction 05/12/2018    Carcinoid tumor of lung w resection 2010 05/12/2018    S/P SEKOU-BSO 05/12/2018    Carotid stenosis 07/23/2015    History of vertigo 05/27/2015    Tinnitus 05/27/2015    Venous (peripheral) insufficiency 01/07/2014    Coronary arteriosclerosis 05/20/2012    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, s/p SEKOU/ BSO  12/01/2009       Portions of the record may have been created with voice recognition software   Occasional wrong word or "sound a like" substitutions may have occurred due to the inherent limitations of voice recognition software  Read the chart carefully and recognize, using context, where substitutions have occurred      Josey Bridges DO, [facility]  6/24/2022 8:38 AM

## 2022-07-11 ENCOUNTER — TELEPHONE (OUTPATIENT)
Dept: FAMILY MEDICINE CLINIC | Facility: CLINIC | Age: 62
End: 2022-07-11

## 2022-07-11 NOTE — TELEPHONE ENCOUNTER
Patient needs a prescription for a mammogram put in her chart please give patient a call when it's ready [phone], she will pick it up

## 2022-09-20 ENCOUNTER — OFFICE VISIT (OUTPATIENT)
Dept: FAMILY MEDICINE CLINIC | Facility: CLINIC | Age: 62
End: 2022-09-20
Payer: COMMERCIAL

## 2022-09-20 VITALS
DIASTOLIC BLOOD PRESSURE: 70 MMHG | WEIGHT: 165 LBS | SYSTOLIC BLOOD PRESSURE: 130 MMHG | HEIGHT: 62 IN | OXYGEN SATURATION: 96 % | HEART RATE: 57 BPM | BODY MASS INDEX: 30.36 KG/M2

## 2022-09-20 DIAGNOSIS — E66.09 CLASS 1 OBESITY DUE TO EXCESS CALORIES WITH SERIOUS COMORBIDITY AND BODY MASS INDEX (BMI) OF 30.0 TO 30.9 IN ADULT: ICD-10-CM

## 2022-09-20 DIAGNOSIS — Z00.00 ENCOUNTER FOR ANNUAL PHYSICAL EXAM: Primary | ICD-10-CM

## 2022-09-20 DIAGNOSIS — I25.10 CORONARY ARTERIOSCLEROSIS: ICD-10-CM

## 2022-09-20 DIAGNOSIS — C56.1 MALIGNANT NEOPLASM OF RIGHT OVARY (HCC): ICD-10-CM

## 2022-09-20 DIAGNOSIS — I65.23 BILATERAL CAROTID ARTERY STENOSIS: ICD-10-CM

## 2022-09-20 DIAGNOSIS — D3A.090 CARCINOID TUMOR OF LUNG, UNSPECIFIED WHETHER MALIGNANT: ICD-10-CM

## 2022-09-20 DIAGNOSIS — M67.88 ACHILLES TENDONOSIS: ICD-10-CM

## 2022-09-20 PROCEDURE — 99396 PREV VISIT EST AGE 40-64: CPT | Performed by: FAMILY MEDICINE

## 2022-09-20 PROCEDURE — 3725F SCREEN DEPRESSION PERFORMED: CPT | Performed by: FAMILY MEDICINE

## 2022-09-20 NOTE — PATIENT INSTRUCTIONS
She is doing well today, she was treated for inflamed sebaceous cyst upper abdominal wall back in June, use doxycycline, had incision and drainage, has resolved  She did see her cardiologist in June, continue to control cardiovascular risk as can, blood pressure is fine, cholesterol last year was excellent  She will redo echocardiogram in November of 2023  I would like her to redo fasting blood work along with urinalysis  Her last carotid ultrasound was 7 years ago, less than 50% bilateral, she will consider doing again, I did place order  Continue with aspirin, atorvastatin, blood pressure control    Status post resection carcinoid lung back in 2010  She has not smoked in 15 years  We discussed doing chest x-ray, we will hold off after discussion  No respiratory complaints    She does use acid suppressant at times, if notes worsening she should call, if does note any exertional chest discomfort she should follow-up with Cardiology sooner than plan  She does not see her Gynecologist routinely   SEKOU BSO 2009 -   stage I right ovary carcinoma  Discussed screening Mammogram, this is up-to-date  We did review previous blood work,   She is up to date with Lipid screening  She is up to date with Diabetes screening  Immunization History   Administered Date(s) Administered    COVID-19 MODERNA VACC 0 5 ML IM 04/14/2021, 05/14/2021    Influenza Quadrivalent, 6-35 Months IM 11/04/2016    Influenza, recombinant, quadrivalent,injectable, preservative free 01/28/2020    Influenza, seasonal, injectable 12/10/2012    Pneumococcal Polysaccharide PPV23 06/11/2019     She does not do yearly Flu shot  Tdap/tetanus shot will be considered by patient  (done every 10 yrs for superficial cuts, every 5 yrs for deep wounds)   Can also look into coverage for new shingles shot, Shingrix  Can do that here or at pharmacy    Covid vaccine rcvd x 2 -    declines booster     Is a former smoker, quit 15 years ago Regarding Colon Cancer screening, we discussed Colonoscopy vs ColoGuard is indicated starting at age 36-53  Screening is up to date  ColoGuard negative 9/2021     Regarding Hepatitis C Screening -   previously perfomed and was negative  Continue to try to watch healthy diet, exercise routinely  Active w work -   has gained about 10 lbs    She does have bilateral Achilles tendinosis, pain in the mornings, can do stretching, can try over-the-counter or prescription diclofenac gel, if worsens she will see orthopedist or podiatrist     We will see her again in 12 months, sooner as needed

## 2022-09-20 NOTE — PROGRESS NOTES
BMI Counseling: Body mass index is 30 18 kg/m²  The BMI is above normal  Nutrition recommendations include encouraging healthy choices of fruits and vegetables  Exercise recommendations include exercising 3-5 times per week  Rationale for BMI follow-up plan is due to patient being overweight or obese  Depression Screening and Follow-up Plan: Patient was screened for depression during today's encounter  They screened negative with a PHQ-2 score of 0       FAMILY PRACTICE OFFICE VISIT  [facility]  [address]  [address], [place], 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, s/p SEKOU/ BSO     Relevant Orders    CBC    Comprehensive metabolic panel      Other Visit Diagnoses     Encounter for annual physical exam    -  Primary    BMI 30              Patient Instructions     She is doing well today, she was treated for inflamed sebaceous cyst upper abdominal wall back in , use doxycycline, had incision and drainage, has resolved  She did see her cardiologist in , continue to control cardiovascular risk as can, blood pressure is fine, cholesterol last year was excellent  She will redo echocardiogram in 2023  I would like her to redo fasting blood work along with urinalysis    Her last carotid ultrasound was 7 years ago, less than 50% bilateral, she will consider doing again, I did place order  Continue with aspirin, atorvastatin, blood pressure control    Status post resection carcinoid lung back in 2010  She has not smoked in 15 years  We discussed doing chest x-ray, we will hold off after discussion  No respiratory complaints    She does use acid suppressant at times, if notes worsening she should call, if does note any exertional chest discomfort she should follow-up with Cardiology sooner than plan  She does not see her Gynecologist routinely  Hx SEKOU BSO 2009 -   stage I right ovary carcinoma  Discussed screening Mammogram, this is up-to-date  We did review previous blood work,   She is up to date with Lipid screening  She is up to date with Diabetes screening  Immunization History   Administered Date(s) Administered    COVID-19 MODERNA VACC 0 5 ML IM 04/14/2021, 05/14/2021    Influenza Quadrivalent, 6-35 Months IM 11/04/2016    Influenza, recombinant, quadrivalent,injectable, preservative free 01/28/2020    Influenza, seasonal, injectable 12/10/2012    Pneumococcal Polysaccharide PPV23 06/11/2019     She does not do yearly Flu shot  Tdap/tetanus shot will be considered by patient  (done every 10 yrs for superficial cuts, every 5 yrs for deep wounds)   Can also look into coverage for new shingles shot, Shingrix  Can do that here or at pharmacy  Covid vaccine rcvd x 2 -    declines booster     Is a former smoker, quit 15 years ago     Regarding Colon Cancer screening, we discussed Colonoscopy vs ColoGuard is indicated starting at age 36-53  Screening is up to date  ColoGuard negative 9/2021     Regarding Hepatitis C Screening -   previously perfomed and was negative  Continue to try to watch healthy diet, exercise routinely     Active w work -   has gained about 10 lbs    She does have bilateral Achilles tendinosis, pain in the mornings, can do stretching, can try over-the-counter or prescription diclofenac gel, if worsens she will see orthopedist or podiatrist     We will see her again in 12 months, sooner as needed  Chief Complaint     Chief Complaint   Patient presents with    Physical Exam     Annual physical       History of Present Illness   Nahomy Croft is a 57y o -year-old female who is in today for a yearly checkup, since I saw her last August she has been feeling well, she did see Cardiology back in June  She has had no chest pain, no increased shortness of breath, no new exertional complaints  Review of Systems   Review of Systems   Constitutional: Negative for appetite change, fatigue, fever and unexpected weight change  HENT: Negative for sore throat and trouble swallowing  Respiratory: Negative for cough, chest tightness, shortness of breath and wheezing  Cardiovascular: Negative for chest pain, palpitations and leg swelling  Gastrointestinal: Negative for abdominal pain, blood in stool, nausea and vomiting  Occasional acid reflux -uses over-the-counter acid reducer about twice weekly    No change in bowel   Genitourinary: Negative for dysuria and hematuria  Musculoskeletal:        Has bilateral Achilles insert tenderness in the morning as before   Neurological: Negative for dizziness, syncope, light-headedness and headaches  Psychiatric/Behavioral: Negative for behavioral problems and confusion         Active Problem List     Patient Active Problem List   Diagnosis    History of vertigo    Tinnitus    Venous (peripheral) insufficiency    Coronary arteriosclerosis    Carotid stenosis    H/O heart artery stent    History of myocardial infarction    Carcinoid tumor of lung w resection 2010    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, s/p SEKOU/ BSO     S/P SEKOU-BSO    Former smoker -  Quit 2007    Achilles tendonosis b/l       Past Medical History:  Reviewed    Past Surgical History:  Reviewed    Family History:  Reviewed    Social History:  Reviewed    Objective     Vitals:    09/20/22 1300   BP: 130/70   BP Location: Left arm   Cuff Size: Standard   Pulse: 57   SpO2: 96%   Weight: 74 8 kg (165 lb)   Height: 5' 2" (1 575 m)     Body mass index is 30 18 kg/m²  BP Readings from Last 3 Encounters:   09/20/22 130/70   06/24/22 130/74   06/09/22 120/60       Wt Readings from Last 3 Encounters:   09/20/22 74 8 kg (165 lb)   06/24/22 73 6 kg (162 lb 3 2 oz)   06/09/22 74 2 kg (163 lb 9 6 oz)       Physical Exam  Constitutional:       General: She is not in acute distress  Appearance: Normal appearance  She is well-developed  She is not ill-appearing  HENT:      Right Ear: Tympanic membrane normal       Left Ear: Tympanic membrane normal    Eyes:      General: No scleral icterus  Neck:      Vascular: No carotid bruit  Cardiovascular:      Rate and Rhythm: Normal rate and regular rhythm  Heart sounds: Normal heart sounds  No murmur heard  Pulmonary:      Effort: Pulmonary effort is normal  No respiratory distress  Breath sounds: Normal breath sounds  No wheezing, rhonchi or rales  Abdominal:      Palpations: Abdomen is soft  Tenderness: There is no abdominal tenderness  Musculoskeletal:      Right lower leg: No edema  Left lower leg: No edema  Lymphadenopathy:      Cervical: No cervical adenopathy  Skin:     Coloration: Skin is not jaundiced  Neurological:      Mental Status: She is alert and oriented to person, place, and time  Mental status is at baseline  Psychiatric:         Mood and Affect: Mood normal          Behavior: Behavior normal          ALLERGIES:  Allergies   Allergen Reactions    Contrast  [Iodinated Diagnostic Agents] Hives    Iodine - Food Allergy      Other reaction(s):  Other (See Comments)  Contrast dye    Niacin      Annotation - [id number]: felt poor w med       Current Medications     Current Outpatient Medications   Medication Sig Dispense Refill    amLODIPine (NORVASC) 5 mg tablet Take 1 tablet (5 mg total) by mouth daily 90 tablet 3    aspirin 81 MG tablet Take 1 tablet by mouth daily      atorvastatin (LIPITOR) 40 mg tablet Take 1 tablet (40 mg total) by mouth daily 90 tablet 3    Cholecalciferol (VITAMIN D3) 1000 units CAPS Take 1 capsule by mouth daily      Magnesium 250 MG TABS Take 1 tablet by mouth Taking 400 mg daily      metoprolol succinate (TOPROL-XL) 25 mg 24 hr tablet Take 1 tablet (25 mg total) by mouth daily 90 tablet 3    Omega-3 Fatty Acids (FISH OIL) 1200 MG CAPS Take 2 capsules by mouth daily        Diclofenac Sodium (VOLTAREN) 1 % Apply 2 g topically 2 (two) times a day as needed (achilles pain) 100 g 0     No current facility-administered medications for this visit              Orders Placed This Encounter   Procedures    CBC    Comprehensive metabolic panel    Lipid panel    Urinalysis with microscopic         Ebony Minors, DO

## 2022-10-02 ENCOUNTER — APPOINTMENT (OUTPATIENT)
Dept: LAB | Facility: HOSPITAL | Age: 62
End: 2022-10-02
Payer: COMMERCIAL

## 2022-10-02 LAB
ALBUMIN SERPL BCP-MCNC: 3.7 G/DL (ref 3.5–5)
ALP SERPL-CCNC: 67 U/L (ref 46–116)
ALT SERPL W P-5'-P-CCNC: 43 U/L (ref 12–78)
ANION GAP SERPL CALCULATED.3IONS-SCNC: 7 MMOL/L (ref 4–13)
AST SERPL W P-5'-P-CCNC: 21 U/L (ref 5–45)
BACTERIA UR QL AUTO: ABNORMAL /HPF
BILIRUB SERPL-MCNC: 0.95 MG/DL (ref 0.2–1)
BILIRUB UR QL STRIP: NEGATIVE
BUN SERPL-MCNC: 14 MG/DL (ref 5–25)
CALCIUM SERPL-MCNC: 9 MG/DL (ref 8.3–10.1)
CHLORIDE SERPL-SCNC: 107 MMOL/L (ref 96–108)
CHOLEST SERPL-MCNC: 152 MG/DL
CLARITY UR: CLEAR
CO2 SERPL-SCNC: 28 MMOL/L (ref 21–32)
COLOR UR: YELLOW
CREAT SERPL-MCNC: 0.7 MG/DL (ref 0.6–1.3)
ERYTHROCYTE [DISTWIDTH] IN BLOOD BY AUTOMATED COUNT: 12.7 % (ref 11.6–15.1)
GFR SERPL CREATININE-BSD FRML MDRD: 93 ML/MIN/1.73SQ M
GLUCOSE P FAST SERPL-MCNC: 102 MG/DL (ref 65–99)
GLUCOSE UR STRIP-MCNC: NEGATIVE MG/DL
HCT VFR BLD AUTO: 41.7 % (ref 34.8–46.1)
HDLC SERPL-MCNC: 57 MG/DL
HGB BLD-MCNC: 13.7 G/DL (ref 11.5–15.4)
HGB UR QL STRIP.AUTO: ABNORMAL
KETONES UR STRIP-MCNC: NEGATIVE MG/DL
LDLC SERPL CALC-MCNC: 86 MG/DL (ref 0–100)
LEUKOCYTE ESTERASE UR QL STRIP: NEGATIVE
MCH RBC QN AUTO: 30 PG (ref 26.8–34.3)
MCHC RBC AUTO-ENTMCNC: 32.9 G/DL (ref 31.4–37.4)
MCV RBC AUTO: 91 FL (ref 82–98)
NITRITE UR QL STRIP: NEGATIVE
NON-SQ EPI CELLS URNS QL MICRO: ABNORMAL /HPF
NONHDLC SERPL-MCNC: 95 MG/DL
PH UR STRIP.AUTO: 5.5 [PH]
PLATELET # BLD AUTO: 211 THOUSANDS/UL (ref 149–390)
PMV BLD AUTO: 9.6 FL (ref 8.9–12.7)
POTASSIUM SERPL-SCNC: 4.5 MMOL/L (ref 3.5–5.3)
PROT SERPL-MCNC: 7.7 G/DL (ref 6.4–8.4)
PROT UR STRIP-MCNC: NEGATIVE MG/DL
RBC # BLD AUTO: 4.56 MILLION/UL (ref 3.81–5.12)
RBC #/AREA URNS AUTO: ABNORMAL /HPF
SODIUM SERPL-SCNC: 142 MMOL/L (ref 135–147)
SP GR UR STRIP.AUTO: 1.02 (ref 1–1.03)
TRIGL SERPL-MCNC: 43 MG/DL
UROBILINOGEN UR QL STRIP.AUTO: 0.2 E.U./DL
WBC # BLD AUTO: 4.6 THOUSAND/UL (ref 4.31–10.16)
WBC #/AREA URNS AUTO: ABNORMAL /HPF

## 2022-10-02 PROCEDURE — 85027 COMPLETE CBC AUTOMATED: CPT | Performed by: FAMILY MEDICINE

## 2022-10-02 PROCEDURE — 80053 COMPREHEN METABOLIC PANEL: CPT | Performed by: FAMILY MEDICINE

## 2022-10-02 PROCEDURE — 81001 URINALYSIS AUTO W/SCOPE: CPT | Performed by: FAMILY MEDICINE

## 2022-10-02 PROCEDURE — 36415 COLL VENOUS BLD VENIPUNCTURE: CPT | Performed by: FAMILY MEDICINE

## 2022-10-02 PROCEDURE — 80061 LIPID PANEL: CPT | Performed by: FAMILY MEDICINE

## 2022-10-07 ENCOUNTER — HOSPITAL ENCOUNTER (OUTPATIENT)
Dept: MAMMOGRAPHY | Facility: CLINIC | Age: 62
End: 2022-10-07
Payer: COMMERCIAL

## 2022-10-07 DIAGNOSIS — Z12.31 ENCOUNTER FOR SCREENING MAMMOGRAM FOR MALIGNANT NEOPLASM OF BREAST: ICD-10-CM

## 2022-10-07 PROCEDURE — 77067 SCR MAMMO BI INCL CAD: CPT

## 2022-10-07 PROCEDURE — 77063 BREAST TOMOSYNTHESIS BI: CPT

## 2023-01-30 DIAGNOSIS — I10 HYPERTENSION, UNSPECIFIED TYPE: ICD-10-CM

## 2023-01-30 DIAGNOSIS — Z95.5 H/O HEART ARTERY STENT: ICD-10-CM

## 2023-01-30 RX ORDER — AMLODIPINE BESYLATE 5 MG/1
TABLET ORAL
Qty: 90 TABLET | Refills: 3 | Status: SHIPPED | OUTPATIENT
Start: 2023-01-30

## 2023-01-30 RX ORDER — METOPROLOL SUCCINATE 25 MG/1
TABLET, EXTENDED RELEASE ORAL
Qty: 90 TABLET | Refills: 3 | Status: SHIPPED | OUTPATIENT
Start: 2023-01-30

## 2023-02-03 DIAGNOSIS — I25.2 HISTORY OF MYOCARDIAL INFARCTION: ICD-10-CM

## 2023-02-03 DIAGNOSIS — I25.10 CORONARY ARTERIOSCLEROSIS: ICD-10-CM

## 2023-02-03 RX ORDER — ATORVASTATIN CALCIUM 40 MG/1
TABLET, FILM COATED ORAL
Qty: 90 TABLET | Refills: 3 | Status: SHIPPED | OUTPATIENT
Start: 2023-02-03

## 2023-07-12 ENCOUNTER — OFFICE VISIT (OUTPATIENT)
Dept: CARDIOLOGY CLINIC | Facility: CLINIC | Age: 63
End: 2023-07-12
Payer: COMMERCIAL

## 2023-07-12 VITALS
DIASTOLIC BLOOD PRESSURE: 74 MMHG | SYSTOLIC BLOOD PRESSURE: 128 MMHG | HEIGHT: 62 IN | BODY MASS INDEX: 29.55 KG/M2 | HEART RATE: 47 BPM | WEIGHT: 160.6 LBS

## 2023-07-12 DIAGNOSIS — R01.1 CARDIAC MURMUR: ICD-10-CM

## 2023-07-12 DIAGNOSIS — I25.2 HISTORY OF MYOCARDIAL INFARCTION: Primary | ICD-10-CM

## 2023-07-12 PROCEDURE — 99214 OFFICE O/P EST MOD 30 MIN: CPT

## 2023-07-12 PROCEDURE — 93000 ELECTROCARDIOGRAM COMPLETE: CPT

## 2023-07-12 NOTE — PROGRESS NOTES
Cardiology   MD Meghan Diaz MD, Mita Almonte DO, FRANCO Joseph MD Judene Pel, DO, Gerhardt Chiquito, DO, [facility]  -------------------------------------------------------------------  Formerly [facility], [facility] and [facility]  [address]  [place], [place] [id number] [phone]  [phone]  07/12/23  Lea Estrada  YOB: 1960   MRN: [id number]      Referring Physician: Kenan Franco DO  [address].  [place], [place] [address]     HPI: Lea Estrada is a 60 y.o. 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  She was initially lost to follow-up with Cardiology over the years since her stents were placed.      Echo 11/2020 shows EF 56% mild LVH grade 1 diastolic dysfunction, apical septal dyskinesis is noted with normal motion of the remaining walls, trace mitral regurgitation, trace tricuspid regurgitation with estimated PASP of 25 mmHg and physiologic pericardial effusion    She presents today for follow-up. She states from heart standpoint she is doing well. Blood pressures controlled. Reviewed her recent labs from last year. She always gets her labs done when she follows up with her PCP in the fall. Her LDL did go up from 71->86. Review of Systems   Constitutional: Negative for chills and fever. HENT: Negative for facial swelling and sore throat. Eyes: Negative for visual disturbance. Respiratory: Negative for cough, chest tightness, shortness of breath and wheezing. Cardiovascular: Negative for chest pain, palpitations and leg swelling. Gastrointestinal: Negative for abdominal pain, blood in stool, constipation, diarrhea, nausea and vomiting.    Endocrine: Negative for cold intolerance and heat intolerance. Genitourinary: Negative for decreased urine volume, difficulty urinating, dysuria and hematuria. Musculoskeletal: Negative for arthralgias, back pain and myalgias. Skin: Negative for rash. Neurological: Negative for dizziness, syncope, weakness and numbness. Psychiatric/Behavioral: Negative for agitation, behavioral problems and confusion. The patient is not nervous/anxious. OBJECTIVE  Vitals:    07/12/23 0900   BP: 128/74   Pulse: (!) 47       Physical Exam  Constitutional: awake, alert and oriented, in no acute distress, no obvious deformities  Head: Normocephalic, without obvious abnormality, atraumatic  Eyes: conjunctivae clear and moist. Sclera anicteric. No xanthelasmas. Pupils equal bilaterally. Extraocular motions are full. Ear nose mouth and throat: ears are symmetrical bilaterally, hearing appears to be equal bilaterally, no nasal discharge or epistaxis, oropharynx is clear with moist mucous membranes  Neck:  Trachea is midline, neck is supple, no thyromegaly or significant lymphadenopathy, there is full range of motion. Lungs: clear to auscultation bilaterally, no wheezes, no rales, no rhonchi, no accessory muscle use, breathing is nonlabored  Heart: regular rate and rhythm, S1, S2 normal, 2/6 GIOVANA RUSB, no click, rub or gallop, no lower extremity edema  Abdomen: soft, non-tender; bowel sounds normal; no masses,  no organomegaly  Psychiatric:  Patient is oriented to time, place, person, mood/affect is negative for depression, anxiety, agitation, appears to have appropriate insight  Skin: Skin is warm, dry, intact. No obvious rashes or lesions on exposed extremities. Nail beds are pink with no cyanosis or clubbing.       EKG:  Results for orders placed or performed in visit on 07/12/23   POCT ECG    Impression    Sinus bradycardia with anteroseptal infarct pattern        IMPRESSION:  Coronary artery disease status post PCI with drug-eluting stents to the LAD and circumflex in 2007 in the setting of myocardial infarction.  Sounds like this was a non ST segment myocardial infarction. Preserved ejection fraction by echo with mild valvular disease  Mild MR, mild TR  Dyslipidemia  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, resolved status post resection  HTN  Cardiac murmur RUSB with radiation to carotids - no sig AoV disease on echo 2020    DISCUSSION/RECOMMENDATIONS:    She is stable from a CV standpoint  LDL did go up - will recheck this fall, if still greater than 70, would uptitrate atorvastatin dose to 80 mg and/or consider adding Zetia  ECG is stable  Continue with amlodipine 5 mg Lipitor 40 for now, aspirin 81 Toprol-XL 25 and fish oil  Given her murmur heard on exam, will check echo in November    The patient was counseled on therapeutic lifestyle changes, including cardiovascular exercise and dietary modification, to promote weight loss, LDL-C reduction and increased cardiovascular fitness (30 minutes/day 5 days/week of moderate intensity exercise). It is recommended to follow a heart healthy diet, with a goal of 75 to 80% of total intake being fresh fruits and vegetables with the rest being lean meats such as chicken, fish-baked or grilled-nothing fried-avoid high glycemic index type foods such as white rice, potatoes, French fries, white bread. Avoid butter or solid butter substitutes such as margarine and instead replace with extra virgin olive oil. Avoid high saturated fat foods, pork, red meat. Avoid fried foods. Avoid excessive sugar intake as well as highly refined sugar type foods-cakes, candies, excessive chocolate intake. Avoid high sodium containing foods and do not add extra salt to meals. Avoid highly processed meats such as lunch meat. Aron Hilario, , [facility], [address]  --------------------------------------------------------------------------------  TREADMILL STRESS  No results found for this or any previous visit. ----------------------------------------------------------------------------------------------  NUCLEAR STRESS TEST: No results found for this or any previous visit. No results found for this or any previous visit.      --------------------------------------------------------------------------------  CATH:  No results found for this or any previous visit.    --------------------------------------------------------------------------------  ECHO:   Results for orders placed during the hospital encounter of 20    Echo complete with contrast if indicated    Narrative  [address] [place] [address], [address] [place] [address]    Transthoracic Echocardiogram  2D, M-mode, Doppler, and Color Doppler    Study date:  2020    Patient: Jose Costello  MR number: [id number]  Account number: [de-identified]  : 1960  Age: 63 years  Gender: Female  Status: Outpatient  Location: [place] [facility]  Height: 62 in  Weight: 157.7 lb  BP: 120/ 62 mmHg    Indications: Murmur    Diagnoses: R01.1 - Cardiac murmur, unspecified    Sonographer:  Lizz Anderson  Interpreting Physician:  Gold Rodriguez DO  Primary Physician:  Ramiro Connell D.O. Referring Physician:  Ramiro Connell D.O. Group:  [facility]    SUMMARY    SUMMARY:  1. This is a technically adequate study  2. Left ventricle is mildly dilated with normal systolic function. Left ventricular ejection fraction is estimated at 56%  3. Mild concentric left ventricular hypertrophy  4. Probable grade 1 diastolic dysfunction  5. Apical septal dyskinesis is noted with normal motion of the remaining walls  6. Mild left atrial dilatation  7. Aortic valve is sclerotic with adequate separation  8. Trace mitral regurgitation  9. Trace tricuspid regurgitation with pulmonary artery systolic pressure is estimated at 25 mm Hg  10. Aortic root appears sclerotic  11.  Physiologic pericardial effusion without echocardiographic indications of tamponade physiology  12. There is no study for comparison    HISTORY: PRIOR HISTORY: CAD, Dyslipidemia, S/P PCI Risk factors: a former history of cigarette use (quitting more than one month ago). PROCEDURE: The study was performed in the [facility]. This was a routine study. The transthoracic approach was used. The study included complete 2D imaging, M-mode, complete spectral Doppler, and color Doppler. The heart rate was 58  bpm, at the start of the study. Image quality was adequate. LEFT VENTRICLE: Left ventricle is mildly dilated with normal systolic function. Left ventricular ejection fraction is estimated at 56%. Mild concentric left ventricular hypertrophy. Probable grade 1 diastolic dysfunction. There appears to  be apical dyskinesis noted at the apical septal wall noted best in the apical four-chamber view. RIGHT VENTRICLE: Right ventricle is normal in size and function. LEFT ATRIUM: Left atrium is mildly dilated. ATRIAL SEPTUM: The interatrial septum appears to be grossly normal without evidence of shunting by color-flow Doppler. RIGHT ATRIUM: Right atrium is normal in size. MITRAL VALVE: Mitral valve opens well. No evidence of mitral stenosis. Trace mitral regurgitation. AORTIC VALVE: Aortic valve sclerotic with adequate separation. Aortic valve is trileaflet. No evidence of aortic stenosis or aortic regurgitation. TRICUSPID VALVE: Tricuspid valve opens well. Trace tricuspid regurgitation. Pulmonary artery systolic pressures are estimated at 25 mm Hg    PULMONIC VALVE: Pulmonic valve not well visualized. No evidence of pulmonic stenosis. Trace pulmonic regurgitation    PERICARDIUM: Physiologic pericardial effusion without echocardiographic indications of tamponade physiology    AORTA: Aortic root is sclerotic, but appears normal in size. SYSTEMIC VEINS: IVC: The IVC is normal size with collapse.     SYSTEM MEASUREMENT TABLES    2D  %FS: 35.62 %  Ao Diam: 3.06 cm  EDV(Teich): 147.81 ml  EF(Teich): 64.51 %  ESV(Teich): 52.46 ml  IVSd: 1.09 cm  LA Area: 13.49 cm2  LA Diam: 4.49 cm  LVEDV MOD A4C: 145.65 ml  LVEF MOD A4C: 56.5 %  LVESV MOD A4C: 63.36 ml  LVIDd: 5.51 cm  LVIDs: 3.55 cm  LVLd A4C: 8.55 cm  LVLs A4C: 7.09 cm  LVPWd: 1.07 cm  RA Area: 12.16 cm2  RVIDd: 3.83 cm  SV MOD A4C: 82.29 ml  SV(Teich): 95.35 ml    CW  TR Vmax: 2.33 m/s  TR maxP.79 mmHg    MM  TAPSE: 2.94 cm    PW  E' Sept: 0.08 m/s  E/E' Sept: 12.01  MV A Humberto: 0.87 m/s  MV Dec Milam: 3.7 m/s2  MV DecT: 264.37 ms  MV E Humberto: 0.98 m/s  MV E/A Ratio: 1.12  MV PHT: 76.67 ms  MVA By PHT: 2.87 cm2    [facility]    Prepared and electronically signed by    Navi Curry DO  Signed  09:51:07    No results found for this or any previous visit.    --------------------------------------------------------------------------------  HOLTER  No results found for this or any previous visit. No results found for this or any previous visit.    --------------------------------------------------------------------------------  CAROTIDS  No results found for this or any previous visit.     --------------------------------------------------------------------------------  Diagnoses and all orders for this visit:    History of myocardial infarction  -     POCT ECG  -     Cancel: Echo complete w/ contrast if indicated; Future  -     Echo complete w/ contrast if indicated; Future    Cardiac murmur  -     Cancel: Echo complete w/ contrast if indicated; Future  -     Echo complete w/ contrast if indicated;  Future       ======================================================    Past Medical History:   Diagnosis Date   • Carcinoid tumor of lung     Carcinoid lung neoplasm - stage 1 w resection ; last assessed - 2013   • Ovarian cancer ([address] [facility]) [id number]    Right stage 1; [id number]   • Thrombophlebitis of arm, left     Thrombophlebitis left; last assessed - [id number]     Past Surgical History:   Procedure Laterality Date   • BREAST BIOPSY Right     Biopsy Breast Percutaneous needle core   • BREAST BIOPSY Left        • CLOSED REDUCTION WRIST FRACTURE Left 2011   • COLONOSCOPY  2009    Complete Colonoscopy   • CORONARY ANGIOPLASTY WITH STENT PLACEMENT     • HYSTERECTOMY      age 51   • LUNG LOBECTOMY      RLL w/early stage carcinoid tumor; Resolved - Approx [id number]   • TOTAL ABDOMINAL HYSTERECTOMY W/ BILATERAL SALPINGOOPHORECTOMY           Medications  Current Outpatient Medications   Medication Sig Dispense Refill   • amLODIPine (NORVASC) 5 mg tablet TAKE 1 TABLET DAILY 90 tablet 3   • aspirin 81 MG tablet Take 1 tablet by mouth daily     • atorvastatin (LIPITOR) 40 mg tablet TAKE 1 TABLET DAILY 90 tablet 3   • Cholecalciferol (VITAMIN D3) 1000 units CAPS Take 1 capsule by mouth daily     • Diclofenac Sodium (VOLTAREN) 1 % Apply 2 g topically 2 (two) times a day as needed (achilles pain) 100 g 0   • Magnesium 250 MG TABS Take 1 tablet by mouth Taking 400 mg daily     • metoprolol succinate (TOPROL-XL) 25 mg 24 hr tablet TAKE 1 TABLET DAILY 90 tablet 3   • Omega-3 Fatty Acids (FISH OIL) 1200 MG CAPS Take 2 capsules by mouth daily         No current facility-administered medications for this visit. Allergies   Allergen Reactions   • Contrast  [Iodinated Contrast Media] Hives   • Iodine - Food Allergy      Other reaction(s):  Other (See Comments)  Contrast dye   • Niacin      [facility] - [id number]: felt poor w med       Social History     Socioeconomic History   • Marital status:      Spouse name: Not on file   • Number of children: Not on file   • Years of education: Not on file   • Highest education level: Not on file   Occupational History   • Occupation: [employer] work - Full-Time   Tobacco Use   • Smoking status: Former     Types: Cigarettes     Quit date: 2007     Years since quittin.5   • Smokeless tobacco: Never Vaping Use   • Vaping Use: Never used   Substance and Sexual Activity   • Alcohol use: Yes     Comment: minimum alcohol consumption   • Drug use: Never   • Sexual activity: Not Currently   Other Topics Concern   • Not on file   Social History Narrative   • Not on file     Social Determinants of Health     Financial Resource Strain: Not on file   Food Insecurity: Not on file   Transportation Needs: Not on file   Physical Activity: Not on file   Stress: Not on file   Social Connections: Not on file   Intimate Partner Violence: Not on file   Housing Stability: Not on file        Family History   Problem Relation Age of Onset   • Breast cancer Mother 76   • Diabetes Mother         Diabetes Mellitus   • No Known Problems Sister    • No Known Problems Daughter    • Breast cancer Sister 48   • No Known Problems Sister    • Breast cancer Sister    • No Known Problems Father    • No Known Problems Maternal Grandmother    • No Known Problems Maternal Grandfather    • No Known Problems Paternal Grandmother    • No Known Problems Paternal Grandfather        Lab Results   Component Value Date    WBC 4.60 10/02/2022    HGB 13.7 10/02/2022    HCT 41.7 10/02/2022    MCV 91 10/02/2022     10/02/2022      Lab Results   Component Value Date    SODIUM 142 10/02/2022    K 4.5 10/02/2022     10/02/2022    CO2 28 10/02/2022    BUN 14 10/02/2022    CREATININE 0.70 10/02/2022    GLUC 104 11/05/2016    CALCIUM 9.0 10/02/2022      No results found for: "HGBA1C"   Lab Results   Component Value Date    CHOL 124 07/01/2015    CHOL 117 02/12/2014     Lab Results   Component Value Date    HDL 57 10/02/2022    HDL 58 08/18/2021    HDL 54 07/31/2020     Lab Results   Component Value Date    LDLCALC 86 10/02/2022    LDLCALC 71 08/18/2021    LDLCALC 72 07/31/2020     Lab Results   Component Value Date    TRIG 43 10/02/2022    TRIG 22 08/18/2021    TRIG 57 07/31/2020     No results found for: "CHOLHDL"   No results found for: "INR", "PROTIME"       Patient Active Problem List    Diagnosis Date Noted   • Achilles tendonosis b/l 09/20/2022   • Former smoker -  Quit 2007 07/30/2020   • H/O heart artery stent 05/12/2018   • History of myocardial infarction 05/12/2018   • Carcinoid tumor of lung w resection 2010 05/12/2018   • S/P SEKOU-BSO 05/12/2018   • Carotid stenosis 07/23/2015   • History of vertigo 05/27/2015   • Tinnitus 05/27/2015   • Venous (peripheral) insufficiency 01/07/2014   • Coronary arteriosclerosis 05/20/2012   • 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, s/p SEKOU/ BSO  12/01/2009       Portions of the record may have been created with voice recognition software. Occasional wrong word or "sound a like" substitutions may have occurred due to the inherent limitations of voice recognition software. Read the chart carefully and recognize, using context, where substitutions have occurred.     Ramiro Connell DO, [facility]  7/12/2023 9:29 AM

## 2023-10-24 ENCOUNTER — OFFICE VISIT (OUTPATIENT)
Dept: FAMILY MEDICINE CLINIC | Facility: CLINIC | Age: 63
End: 2023-10-24
Payer: COMMERCIAL

## 2023-10-24 VITALS
RESPIRATION RATE: 12 BRPM | TEMPERATURE: 97.8 F | SYSTOLIC BLOOD PRESSURE: 130 MMHG | HEIGHT: 62 IN | OXYGEN SATURATION: 99 % | WEIGHT: 154.4 LBS | BODY MASS INDEX: 28.41 KG/M2 | HEART RATE: 50 BPM | DIASTOLIC BLOOD PRESSURE: 60 MMHG

## 2023-10-24 DIAGNOSIS — D3A.090 CARCINOID TUMOR OF LUNG, UNSPECIFIED WHETHER MALIGNANT: ICD-10-CM

## 2023-10-24 DIAGNOSIS — Z00.00 ENCOUNTER FOR ANNUAL PHYSICAL EXAM: Primary | ICD-10-CM

## 2023-10-24 DIAGNOSIS — M54.50 ACUTE RIGHT-SIDED LOW BACK PAIN, UNSPECIFIED WHETHER SCIATICA PRESENT: ICD-10-CM

## 2023-10-24 DIAGNOSIS — Z23 INFLUENZA VACCINE NEEDED: ICD-10-CM

## 2023-10-24 DIAGNOSIS — I25.10 CORONARY ARTERIOSCLEROSIS: ICD-10-CM

## 2023-10-24 DIAGNOSIS — Z12.31 SCREENING MAMMOGRAM, ENCOUNTER FOR: ICD-10-CM

## 2023-10-24 DIAGNOSIS — I65.29 STENOSIS OF CAROTID ARTERY, UNSPECIFIED LATERALITY: ICD-10-CM

## 2023-10-24 DIAGNOSIS — C56.1 MALIGNANT NEOPLASM OF RIGHT OVARY (HCC): ICD-10-CM

## 2023-10-24 PROCEDURE — 99396 PREV VISIT EST AGE 40-64: CPT | Performed by: FAMILY MEDICINE

## 2023-10-24 PROCEDURE — 90686 IIV4 VACC NO PRSV 0.5 ML IM: CPT

## 2023-10-24 PROCEDURE — 90471 IMMUNIZATION ADMIN: CPT

## 2023-10-24 NOTE — PATIENT INSTRUCTIONS
Reviewed health history along with medication, doing well here today other than she does have right low back pain which radiates at times to right thigh. Does remain very active with repetitive work, lifting, still full-time, if pain is continuing next few days I would discuss with work, consider formal physical therapy. She saw cardiology back in July, continue to control cardiovascular risk as can, blood pressure is okay, stay on amlodipine, aspirin, atorvastatin, metoprolol succinate as is. I would like her to redo carotid ultrasound, bruit on the left, less than 50% stenosis on testing 8 years ago. We did review previous blood work, last LDL had risen slightly to 80, cardiology considering increase of atorvastatin/med change, I would like her to redo fasting blood work along with urine testing. She is up to date with Diabetes screening. Immunization History   Administered Date(s) Administered    COVID-19 MODERNA VACC 0.5 ML IM 04/14/2021, 05/14/2021    Influenza Quadrivalent, 6-35 Months IM 11/04/2016    Influenza, recombinant, quadrivalent,injectable, preservative free 01/28/2020    Influenza, seasonal, injectable 12/10/2012    Pneumococcal Polysaccharide PPV23 06/11/2019     She does not do yearly Flu shot routinely -she will do that here today  Pneumococcal vax is UTD  Tdap/tetanus shot is due  will be done at a future date  (done every 10 yrs for superficial cuts, every 5 yrs for deep wounds)   Can also look into coverage for 'shingles' shot, Shingrix. Can do that here or at pharmacy. Covid vaccine rcvd x 2 -  consider booster     Is a former smoker, quit 16 years ago-history resection carcinoid lung 2010, redo chest x-ray at her convenience. Regarding Colon Cancer screening, we discussed Colonoscopy vs ColoGuard is indicated starting at age 39  Screening is up to date. ColoGuard was ok 9/2021    She does not see her Gynecologist routinely.  Hx SEKOU/ BSO with stage I ovarian cancer 2009  Discussed screening Mammogram, was ok 10/22-  slip given to redo     Regarding Hepatitis C Screening -previous screening showed low reactivity, PCR was negative    Continue to try to watch healthy diet, exercise routinely. We will see her again in 12 months, sooner as needed.   Plans to still be working at that time

## 2023-10-24 NOTE — PROGRESS NOTES
BMI Counseling: Body mass index is 28.24 kg/m². The BMI is above normal. Nutrition recommendations include encouraging healthy choices of fruits and vegetables. Rationale for BMI follow-up plan is due to patient being overweight or obese. Depression Screening and Follow-up Plan: Patient was screened for depression during today's encounter. They screened negative with a PHQ-2 score of 0.      FAMILY PRACTICE OFFICE VISIT  Gurwinder Kerr D.O. [address] [place] [address] Primary Care  [address] [place] [address], [facility]      NAME: Sarah Camara  AGE: 58 y.o. 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, s/p SEKOU/ BSO     Relevant Orders    CBC    Comprehensive metabolic panel     Other Visit Diagnoses       Encounter for annual physical exam    -  Primary    Screening mammogram, encounter for        Relevant Orders    Mammo screening bilateral w 3d & cad    Acute right-sided low back pain, radiates to right thigh at times        Influenza vaccine needed        Relevant Orders    influenza vaccine, quadrivalent, 0.5 mL, preservative-free, for adult and pediatric patients 6 mos+ (AFLURIA, FLUARIX, FLULAVAL, FLUZONE) (Completed)            Patient Instructions   Reviewed health history along with medication, doing well here today other than she does have right low back pain which radiates at times to right thigh. Does remain very active with repetitive work, lifting, still full-time, if pain is continuing next few days I would discuss with work, consider formal physical therapy. She saw cardiology back in July, continue to control cardiovascular risk as can, blood pressure is okay, stay on amlodipine, aspirin, atorvastatin, metoprolol succinate as is. I would like her to redo carotid ultrasound, bruit on the left, less than 50% stenosis on testing 8 years ago. We did review previous blood work, last LDL had risen slightly to 80, cardiology considering increase of atorvastatin/med change, I would like her to redo fasting blood work along with urine testing. She is up to date with Diabetes screening. Immunization History   Administered Date(s) Administered    COVID-19 MODERNA VACC 0.5 ML IM 04/14/2021, 05/14/2021    Influenza Quadrivalent, 6-35 Months IM 11/04/2016    Influenza, recombinant, quadrivalent,injectable, preservative free 01/28/2020    Influenza, seasonal, injectable 12/10/2012    Pneumococcal Polysaccharide PPV23 06/11/2019     She does not do yearly Flu shot routinely -she will do that here today  Pneumococcal vax is UTD  Tdap/tetanus shot is due  will be done at a future date  (done every 10 yrs for superficial cuts, every 5 yrs for deep wounds)   Can also look into coverage for 'shingles' shot, Shingrix. Can do that here or at pharmacy. Covid vaccine rcvd x 2 -  consider booster     Is a former smoker, quit 16 years ago-history resection carcinoid lung 2010, redo chest x-ray at her convenience. Regarding Colon Cancer screening, we discussed Colonoscopy vs ColoGuard is indicated starting at age 39  Screening is up to date. ColoGuard was ok 9/2021    She does not see her Gynecologist routinely.  Hx SEKOU/ BSO with stage I ovarian cancer 2009  Discussed screening Mammogram, was ok 10/22-  slip given to redo     Regarding Hepatitis C Screening -previous screening showed low reactivity, PCR was negative    Continue to try to watch healthy diet, exercise routinely. We will see her again in 12 months, sooner as needed. Plans to still be working at that time         Chief Complaint     Chief Complaint   Patient presents with    Physical Exam     Pt received flu vaccine in left deltoid. Pt tolerated injection well. History of Present Illness   Italia Jones is a 58y.o.-year-old female who is in for a yearly checkup, overall she is feeling well, has noted some recent right-sided low back pain, radiates to right thigh at times. Continues to work full-time, repetitive lifting, movement, no fall. No chest pain, no shortness of breath, is using medication as directed      Review of Systems   Review of Systems   Constitutional:  Negative for appetite change, fatigue, fever and unexpected weight change. HENT:  Negative for sore throat and trouble swallowing. Respiratory:  Negative for cough, chest tightness and shortness of breath. Cardiovascular:  Negative for chest pain, palpitations and leg swelling. Gastrointestinal:  Negative for abdominal pain, blood in stool, nausea and vomiting. No acid reflux     No change in bowel   Genitourinary:  Negative for dysuria and hematuria. Musculoskeletal:  Positive for back pain. Neurological:  Negative for dizziness, syncope, light-headedness and headaches. Psychiatric/Behavioral:  Negative for behavioral problems and confusion.         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 BP Readings from Last 3 Encounters:   10/24/23 130/60   07/12/23 128/74   09/20/22 130/70       Wt Readings from Last 3 Encounters:   10/24/23 70 kg (154 lb 6.4 oz)   07/12/23 72.8 kg (160 lb 9.6 oz)   09/20/22 74.8 kg (165 lb)       Physical Exam  Constitutional:       General: She is not in acute distress. Appearance: Normal appearance. She is well-developed. She is not ill-appearing. Eyes:      General: No scleral icterus. Neck:      Vascular: Carotid bruit present. Cardiovascular:      Rate and Rhythm: Normal rate and regular rhythm. Heart sounds: Murmur heard. Pulmonary:      Effort: Pulmonary effort is normal. No respiratory distress. Breath sounds: Normal breath sounds. No wheezing, rhonchi or rales. Abdominal:      Palpations: Abdomen is soft. Tenderness: There is no abdominal tenderness. Musculoskeletal:      Right lower leg: No edema. Left lower leg: No edema. Lymphadenopathy:      Cervical: No cervical adenopathy. Skin:     Coloration: Skin is not jaundiced. Neurological:      Mental Status: She is alert and oriented to person, place, and time. Mental status is at baseline. Psychiatric:         Mood and Affect: Mood normal.         Behavior: Behavior normal.         ALLERGIES:  Allergies   Allergen Reactions    Contrast  [Iodinated Contrast Media] Hives    Iodine - Food Allergy      Other reaction(s):  Other (See Comments)  Contrast dye    Niacin      Annotation - [id number]: felt poor w med       Current Medications     Current Outpatient Medications   Medication Sig Dispense Refill    amLODIPine (NORVASC) 5 mg tablet TAKE 1 TABLET DAILY 90 tablet 3    aspirin 81 MG tablet Take 1 tablet by mouth daily      atorvastatin (LIPITOR) 40 mg tablet TAKE 1 TABLET DAILY 90 tablet 3    Cholecalciferol (VITAMIN D3) 1000 units CAPS Take 1 capsule by mouth daily      Diclofenac Sodium (VOLTAREN) 1 % Apply 2 g topically 2 (two) times a day as needed (achilles pain) 100 g 0    Magnesium 250 MG TABS Take 1 tablet by mouth Taking 400 mg daily      metoprolol succinate (TOPROL-XL) 25 mg 24 hr tablet TAKE 1 TABLET DAILY 90 tablet 3    Omega-3 Fatty Acids (FISH OIL) 1200 MG CAPS Take 2 capsules by mouth daily         No current facility-administered medications for this visit.             Orders Placed This Encounter   Procedures    Mammo screening bilateral w 3d & cad    XR chest pa & lateral    influenza vaccine, quadrivalent, 0.5 mL, preservative-free, for adult and pediatric patients 6 mos+ (AFLURIA, FLUARIX, FLULAVAL, FLUZONE)    CBC    Comprehensive metabolic panel    Lipid panel    Urinalysis with microscopic    TSH, 3rd generation with Free T4 reflex         Sirisha Randolph, DO

## 2023-10-25 ENCOUNTER — APPOINTMENT (OUTPATIENT)
Dept: LAB | Facility: HOSPITAL | Age: 63
End: 2023-10-25
Payer: COMMERCIAL

## 2023-10-25 LAB
ALBUMIN SERPL BCP-MCNC: 4.2 G/DL (ref 3.5–5)
ALP SERPL-CCNC: 51 U/L (ref 34–104)
ALT SERPL W P-5'-P-CCNC: 21 U/L (ref 7–52)
ANION GAP SERPL CALCULATED.3IONS-SCNC: 5 MMOL/L
AST SERPL W P-5'-P-CCNC: 19 U/L (ref 13–39)
BACTERIA UR QL AUTO: ABNORMAL /HPF
BILIRUB SERPL-MCNC: 1.47 MG/DL (ref 0.2–1)
BILIRUB UR QL STRIP: NEGATIVE
BUN SERPL-MCNC: 15 MG/DL (ref 5–25)
CALCIUM SERPL-MCNC: 9.3 MG/DL (ref 8.4–10.2)
CHLORIDE SERPL-SCNC: 106 MMOL/L (ref 96–108)
CHOLEST SERPL-MCNC: 147 MG/DL
CLARITY UR: CLEAR
CO2 SERPL-SCNC: 29 MMOL/L (ref 21–32)
COLOR UR: ABNORMAL
CREAT SERPL-MCNC: 0.73 MG/DL (ref 0.6–1.3)
ERYTHROCYTE [DISTWIDTH] IN BLOOD BY AUTOMATED COUNT: 13 % (ref 11.6–15.1)
GFR SERPL CREATININE-BSD FRML MDRD: 87 ML/MIN/1.73SQ M
GLUCOSE P FAST SERPL-MCNC: 90 MG/DL (ref 65–99)
GLUCOSE UR STRIP-MCNC: NEGATIVE MG/DL
HCT VFR BLD AUTO: 42.1 % (ref 34.8–46.1)
HDLC SERPL-MCNC: 56 MG/DL
HGB BLD-MCNC: 14.1 G/DL (ref 11.5–15.4)
HGB UR QL STRIP.AUTO: NEGATIVE
HYALINE CASTS #/AREA URNS LPF: ABNORMAL /LPF
KETONES UR STRIP-MCNC: NEGATIVE MG/DL
LDLC SERPL CALC-MCNC: 70 MG/DL (ref 0–100)
LEUKOCYTE ESTERASE UR QL STRIP: NEGATIVE
MCH RBC QN AUTO: 30.1 PG (ref 26.8–34.3)
MCHC RBC AUTO-ENTMCNC: 33.5 G/DL (ref 31.4–37.4)
MCV RBC AUTO: 90 FL (ref 82–98)
MUCOUS THREADS UR QL AUTO: ABNORMAL
NITRITE UR QL STRIP: NEGATIVE
NON-SQ EPI CELLS URNS QL MICRO: ABNORMAL /HPF
NONHDLC SERPL-MCNC: 91 MG/DL
PH UR STRIP.AUTO: 5.5 [PH]
PLATELET # BLD AUTO: 234 THOUSANDS/UL (ref 149–390)
PMV BLD AUTO: 10 FL (ref 8.9–12.7)
POTASSIUM SERPL-SCNC: 4.2 MMOL/L (ref 3.5–5.3)
PROT SERPL-MCNC: 7.1 G/DL (ref 6.4–8.4)
PROT UR STRIP-MCNC: NEGATIVE MG/DL
RBC # BLD AUTO: 4.68 MILLION/UL (ref 3.81–5.12)
RBC #/AREA URNS AUTO: ABNORMAL /HPF
SODIUM SERPL-SCNC: 140 MMOL/L (ref 135–147)
SP GR UR STRIP.AUTO: 1.01 (ref 1–1.03)
TRIGL SERPL-MCNC: 105 MG/DL
TSH SERPL DL<=0.05 MIU/L-ACNC: 2.09 UIU/ML (ref 0.45–4.5)
UROBILINOGEN UR STRIP-ACNC: <2 MG/DL
WBC # BLD AUTO: 4.82 THOUSAND/UL (ref 4.31–10.16)
WBC #/AREA URNS AUTO: ABNORMAL /HPF

## 2023-10-25 PROCEDURE — 36415 COLL VENOUS BLD VENIPUNCTURE: CPT | Performed by: FAMILY MEDICINE

## 2023-10-25 PROCEDURE — 81001 URINALYSIS AUTO W/SCOPE: CPT | Performed by: FAMILY MEDICINE

## 2023-10-25 PROCEDURE — 85027 COMPLETE CBC AUTOMATED: CPT | Performed by: FAMILY MEDICINE

## 2023-10-25 PROCEDURE — 84443 ASSAY THYROID STIM HORMONE: CPT | Performed by: FAMILY MEDICINE

## 2023-10-25 PROCEDURE — 80053 COMPREHEN METABOLIC PANEL: CPT | Performed by: FAMILY MEDICINE

## 2023-10-25 PROCEDURE — 80061 LIPID PANEL: CPT | Performed by: FAMILY MEDICINE

## 2023-11-09 ENCOUNTER — HOSPITAL ENCOUNTER (OUTPATIENT)
Dept: NON INVASIVE DIAGNOSTICS | Facility: HOSPITAL | Age: 63
Discharge: HOME | End: 2023-11-09
Payer: COMMERCIAL

## 2023-11-09 VITALS
DIASTOLIC BLOOD PRESSURE: 60 MMHG | BODY MASS INDEX: 28.34 KG/M2 | HEIGHT: 62 IN | HEART RATE: 70 BPM | SYSTOLIC BLOOD PRESSURE: 130 MMHG | WEIGHT: 154 LBS

## 2023-11-09 DIAGNOSIS — R01.1 CARDIAC MURMUR: ICD-10-CM

## 2023-11-09 DIAGNOSIS — I25.2 HISTORY OF MYOCARDIAL INFARCTION: ICD-10-CM

## 2023-11-09 LAB
AORTIC ROOT: 2.8 CM
APICAL FOUR CHAMBER EJECTION FRACTION: 56 %
E WAVE DECELERATION TIME: 250 MS
E/A RATIO: 0.8
FRACTIONAL SHORTENING: 31 (ref 28–44)
INTERVENTRICULAR SEPTUM IN DIASTOLE (PARASTERNAL SHORT AXIS VIEW): 1.1 CM
INTERVENTRICULAR SEPTUM: 1.1 CM (ref 0.6–1.1)
LAAS-AP2: 20.8 CM2
LAAS-AP4: 14.8 CM2
LEFT ATRIUM AREA SYSTOLE SINGLE PLANE A4C: 13.1 CM2
LEFT ATRIUM SIZE: 4 CM
LEFT ATRIUM VOLUME (MOD BIPLANE): 62 ML
LEFT ATRIUM VOLUME INDEX (MOD BIPLANE): 36.3 ML/M2
LEFT INTERNAL DIMENSION IN SYSTOLE: 3.1 CM (ref 2.1–4)
LEFT VENTRICULAR INTERNAL DIMENSION IN DIASTOLE: 4.5 CM (ref 3.5–6)
LEFT VENTRICULAR POSTERIOR WALL IN END DIASTOLE: 1.1 CM
LEFT VENTRICULAR STROKE VOLUME: 53 ML
LVSV (TEICH): 53 ML
MV E'TISSUE VEL-SEP: 7 CM/S
MV PEAK A VEL: 0.9 M/S
MV PEAK E VEL: 72 CM/S
MV STENOSIS PRESSURE HALF TIME: 72 MS
MV VALVE AREA P 1/2 METHOD: 3.06
RIGHT ATRIUM AREA SYSTOLE A4C: 9.9 CM2
RIGHT VENTRICLE ID DIMENSION: 3.4 CM
SL CV LEFT ATRIUM LENGTH A2C: 5.1 CM
SL CV LV EF: 55
SL CV PED ECHO LEFT VENTRICLE DIASTOLIC VOLUME (MOD BIPLANE) 2D: 91 ML
SL CV PED ECHO LEFT VENTRICLE SYSTOLIC VOLUME (MOD BIPLANE) 2D: 38 ML
TR MAX PG: 13 MMHG
TR PEAK VELOCITY: 1.8 M/S
TRICUSPID ANNULAR PLANE SYSTOLIC EXCURSION: 2.2 CM
TRICUSPID VALVE PEAK REGURGITATION VELOCITY: 1.78 M/S

## 2023-11-09 PROCEDURE — 93306 TTE W/DOPPLER COMPLETE: CPT

## 2023-11-09 PROCEDURE — 93306 TTE W/DOPPLER COMPLETE: CPT | Performed by: STUDENT IN AN ORGANIZED HEALTH CARE EDUCATION/TRAINING PROGRAM

## 2023-12-01 ENCOUNTER — TELEPHONE (OUTPATIENT)
Dept: CARDIOLOGY CLINIC | Facility: CLINIC | Age: 63
End: 2023-12-01

## 2023-12-01 NOTE — TELEPHONE ENCOUNTER
Pt calling for results of echo and labs in reference to cholesterol as discussed at [address] [place] [address]  Please advise. PCP ordered the Lipid Profile.

## 2024-01-25 DIAGNOSIS — Z95.5 H/O HEART ARTERY STENT: ICD-10-CM

## 2024-01-25 DIAGNOSIS — I10 HYPERTENSION, UNSPECIFIED TYPE: ICD-10-CM

## 2024-01-25 RX ORDER — METOPROLOL SUCCINATE 25 MG/1
TABLET, EXTENDED RELEASE ORAL
Qty: 90 TABLET | Refills: 3 | Status: SHIPPED | OUTPATIENT
Start: 2024-01-25

## 2024-01-25 RX ORDER — AMLODIPINE BESYLATE 5 MG/1
TABLET ORAL
Qty: 90 TABLET | Refills: 3 | Status: SHIPPED | OUTPATIENT
Start: 2024-01-25

## 2024-01-29 DIAGNOSIS — I25.2 HISTORY OF MYOCARDIAL INFARCTION: ICD-10-CM

## 2024-01-29 DIAGNOSIS — I25.10 CORONARY ARTERIOSCLEROSIS: ICD-10-CM

## 2024-01-29 RX ORDER — ATORVASTATIN CALCIUM 40 MG/1
TABLET, FILM COATED ORAL
Qty: 90 TABLET | Refills: 1 | Status: SHIPPED | OUTPATIENT
Start: 2024-01-29

## 2024-07-29 DIAGNOSIS — I25.10 CORONARY ARTERIOSCLEROSIS: ICD-10-CM

## 2024-07-29 DIAGNOSIS — I25.2 HISTORY OF MYOCARDIAL INFARCTION: ICD-10-CM

## 2024-07-30 RX ORDER — ATORVASTATIN CALCIUM 40 MG/1
TABLET, FILM COATED ORAL
Qty: 100 TABLET | Refills: 1 | Status: SHIPPED | OUTPATIENT
Start: 2024-07-30

## 2024-11-12 ENCOUNTER — OFFICE VISIT (OUTPATIENT)
Dept: FAMILY MEDICINE CLINIC | Facility: CLINIC | Age: 64
End: 2024-11-12
Payer: COMMERCIAL

## 2024-11-12 VITALS
TEMPERATURE: 98 F | HEIGHT: 62 IN | HEART RATE: 60 BPM | WEIGHT: 159.2 LBS | RESPIRATION RATE: 14 BRPM | SYSTOLIC BLOOD PRESSURE: 128 MMHG | OXYGEN SATURATION: 96 % | DIASTOLIC BLOOD PRESSURE: 72 MMHG | BODY MASS INDEX: 29.3 KG/M2

## 2024-11-12 DIAGNOSIS — Z13.89 SCREENING FOR BLOOD OR PROTEIN IN URINE: ICD-10-CM

## 2024-11-12 DIAGNOSIS — Z00.00 ANNUAL PHYSICAL EXAM: Primary | ICD-10-CM

## 2024-11-12 DIAGNOSIS — I25.2 HISTORY OF MYOCARDIAL INFARCTION: ICD-10-CM

## 2024-11-12 DIAGNOSIS — Z13.1 SCREENING FOR DIABETES MELLITUS: ICD-10-CM

## 2024-11-12 DIAGNOSIS — Z12.31 ENCOUNTER FOR SCREENING MAMMOGRAM FOR MALIGNANT NEOPLASM OF BREAST: ICD-10-CM

## 2024-11-12 DIAGNOSIS — Z12.11 SCREEN FOR COLON CANCER: ICD-10-CM

## 2024-11-12 DIAGNOSIS — Z23 NEED FOR PROPHYLACTIC VACCINATION WITH COMBINED DIPHTHERIA-TETANUS-PERTUSSIS (DTP) VACCINE: ICD-10-CM

## 2024-11-12 DIAGNOSIS — I25.10 CORONARY ARTERIOSCLEROSIS: ICD-10-CM

## 2024-11-12 DIAGNOSIS — Z23 ENCOUNTER FOR IMMUNIZATION: ICD-10-CM

## 2024-11-12 DIAGNOSIS — I10 ESSENTIAL HYPERTENSION: ICD-10-CM

## 2024-11-12 DIAGNOSIS — Z13.0 SCREENING FOR DEFICIENCY ANEMIA: ICD-10-CM

## 2024-11-12 DIAGNOSIS — E78.2 MIXED HYPERLIPIDEMIA: ICD-10-CM

## 2024-11-12 DIAGNOSIS — Z13.29 SCREENING FOR HYPOTHYROIDISM: ICD-10-CM

## 2024-11-12 PROCEDURE — 90471 IMMUNIZATION ADMIN: CPT | Performed by: INTERNAL MEDICINE

## 2024-11-12 PROCEDURE — 90662 IIV NO PRSV INCREASED AG IM: CPT | Performed by: INTERNAL MEDICINE

## 2024-11-12 PROCEDURE — 99214 OFFICE O/P EST MOD 30 MIN: CPT | Performed by: INTERNAL MEDICINE

## 2024-11-12 PROCEDURE — 99397 PER PM REEVAL EST PAT 65+ YR: CPT | Performed by: INTERNAL MEDICINE

## 2024-11-12 NOTE — ASSESSMENT & PLAN NOTE
She does not report any exertional symptoms.  Recheck lipid panel.  Continue current dose of atorvastatin and baby aspirin.  Increase fish oil up to 4000 mg daily.

## 2024-11-12 NOTE — PROGRESS NOTES
Assessment/Plan:    Coronary arteriosclerosis  She does not report any exertional symptoms.  Recheck lipid panel.  Continue current dose of atorvastatin and baby aspirin.  Increase fish oil up to 4000 mg daily.    History of myocardial infarction  Continue follow-up with cardiology.  Continue current dose of Lipitor, metoprolol, aspirin.    Essential hypertension  Blood pressure is well-controlled on current dose of metoprolol and amlodipine.  Continue the same.    Mixed hyperlipidemia  Recheck lipid panel, continue current dose of Lipitor.       Diagnoses and all orders for this visit:    Annual physical exam    Encounter for screening mammogram for malignant neoplasm of breast  -     Mammo screening bilateral w cad; Future    Screen for colon cancer  -     Cologuard    Essential hypertension  -     Comprehensive metabolic panel; Future    Screening for hypothyroidism  -     TSH, 3rd generation with Free T4 reflex; Future    Screening for deficiency anemia  -     CBC and differential; Future    Mixed hyperlipidemia  -     Lipid panel; Future    Screening for blood or protein in urine  -     UA (URINE) with reflex to Scope; Future    Screening for diabetes mellitus  -     Hemoglobin A1C; Future    Need for prophylactic vaccination with combined diphtheria-tetanus-pertussis (DTP) vaccine  -     tetanus-diphtheria-acellular pertussis (ADACEL) 5-2-15.5 LF-mcg/0.5 injection; Inject 0.5 mL into a muscle once for 1 dose    Encounter for immunization  -     influenza vaccine, high-dose, PF 0.5 mL (Fluzone High Dose)    Coronary arteriosclerosis    History of myocardial infarction          Subjective:      Patient ID: Bouchra Vega is a 64 y.o. female.    Patient came today for annual checkup and to follow-up on her chronic medical problems.  Her vital signs are great except of slightly elevated BMI.  She agreed for colon cancer screening and breast cancer screening.  Agreed for tetanus shot and flu shot.  She is physically  "active, eats healthy.          The following portions of the patient's history were reviewed and updated as appropriate: allergies, current medications, past family history, past medical history, past social history, past surgical history, and problem list.    Review of Systems   Constitutional:  Negative for activity change, appetite change, chills, fatigue and fever.   HENT:  Negative for congestion, ear pain, rhinorrhea and sore throat.    Respiratory:  Negative for cough, shortness of breath and wheezing.    Cardiovascular:  Negative for chest pain, palpitations and leg swelling.   Gastrointestinal:  Negative for abdominal distention, abdominal pain, diarrhea, nausea and vomiting.   Genitourinary:  Negative for difficulty urinating, frequency and pelvic pain.   Musculoskeletal:  Negative for arthralgias, back pain and neck pain.   Skin:  Negative for rash.   Neurological:  Negative for dizziness, tremors, weakness, numbness and headaches.         Objective:      /72 (BP Location: Left arm, Patient Position: Sitting, Cuff Size: Standard)   Pulse 60   Temp 98 °F (36.7 °C) (Temporal)   Resp 14   Ht 5' 2\" (1.575 m)   Wt 72.2 kg (159 lb 3.2 oz)   SpO2 96%   BMI 29.12 kg/m²     Allergies   Allergen Reactions    Contrast  [Iodinated Contrast Media] Hives    Iodine - Food Allergy      Other reaction(s): Other (See Comments)  Contrast dye    Niacin      Annotation - [id number]: felt poor w med          Current Outpatient Medications:     amLODIPine (NORVASC) 5 mg tablet, TAKE 1 TABLET DAILY, Disp: 90 tablet, Rfl: 3    aspirin 81 MG tablet, Take 1 tablet by mouth daily, Disp: , Rfl:     atorvastatin (LIPITOR) 40 mg tablet, TAKE 1 TABLET DAILY, Disp: 100 tablet, Rfl: 1    Cholecalciferol (VITAMIN D3) 1000 units CAPS, Take 1 capsule by mouth daily, Disp: , Rfl:     Diclofenac Sodium (VOLTAREN) 1 %, Apply 2 g topically 2 (two) times a day as needed (achilles pain), Disp: 100 g, Rfl: 0    Magnesium 250 MG TABS, " Take 1 tablet by mouth Taking 400 mg daily, Disp: , Rfl:     metoprolol succinate (TOPROL-XL) 25 mg 24 hr tablet, TAKE 1 TABLET DAILY, Disp: 90 tablet, Rfl: 3    Omega-3 Fatty Acids (FISH OIL) 1200 MG CAPS, Take 2 capsules by mouth daily  , Disp: , Rfl:     tetanus-diphtheria-acellular pertussis (ADACEL) 5-2-15.5 LF-mcg/0.5 injection, Inject 0.5 mL into a muscle once for 1 dose, Disp: 0.5 mL, Rfl: 0     There are no Patient Instructions on file for this visit.        Physical Exam  Constitutional:       General: She is not in acute distress.     Appearance: Normal appearance. She is not ill-appearing.   HENT:      Nose: No rhinorrhea.   Cardiovascular:      Rate and Rhythm: Normal rate and regular rhythm.      Heart sounds: No murmur heard.     No friction rub. No gallop.   Pulmonary:      Effort: No respiratory distress.      Breath sounds: No wheezing or rhonchi.   Chest:      Chest wall: No tenderness.   Abdominal:      General: There is no distension.      Palpations: There is no mass.      Tenderness: There is no abdominal tenderness. There is no guarding or rebound.      Hernia: No hernia is present.   Musculoskeletal:         General: No swelling or tenderness.   Lymphadenopathy:      Cervical: No cervical adenopathy.   Skin:     Coloration: Skin is not jaundiced.      Findings: No rash.   Neurological:      Mental Status: She is alert and oriented to person, place, and time.      Motor: No weakness.      Gait: Gait normal.   Psychiatric:         Mood and Affect: Mood normal.         Behavior: Behavior normal.

## 2024-11-12 NOTE — ASSESSMENT & PLAN NOTE
Blood pressure is well-controlled on current dose of metoprolol and amlodipine.  Continue the same.

## 2024-11-13 ENCOUNTER — APPOINTMENT (OUTPATIENT)
Dept: LAB | Facility: HOSPITAL | Age: 64
End: 2024-11-13
Payer: COMMERCIAL

## 2024-11-13 DIAGNOSIS — Z13.29 SCREENING FOR HYPOTHYROIDISM: ICD-10-CM

## 2024-11-13 DIAGNOSIS — E78.2 MIXED HYPERLIPIDEMIA: ICD-10-CM

## 2024-11-13 DIAGNOSIS — Z13.1 SCREENING FOR DIABETES MELLITUS: ICD-10-CM

## 2024-11-13 DIAGNOSIS — I10 ESSENTIAL HYPERTENSION: ICD-10-CM

## 2024-11-13 DIAGNOSIS — Z13.0 SCREENING FOR DEFICIENCY ANEMIA: ICD-10-CM

## 2024-11-13 DIAGNOSIS — Z13.89 SCREENING FOR BLOOD OR PROTEIN IN URINE: ICD-10-CM

## 2024-11-13 LAB
ALBUMIN SERPL BCG-MCNC: 4.4 G/DL (ref 3.5–5)
ALP SERPL-CCNC: 52 U/L (ref 34–104)
ALT SERPL W P-5'-P-CCNC: 25 U/L (ref 7–52)
ANION GAP SERPL CALCULATED.3IONS-SCNC: 4 MMOL/L (ref 4–13)
AST SERPL W P-5'-P-CCNC: 20 U/L (ref 13–39)
BACTERIA UR QL AUTO: ABNORMAL /HPF
BASOPHILS # BLD AUTO: 0.04 THOUSANDS/ÂΜL (ref 0–0.1)
BASOPHILS NFR BLD AUTO: 1 % (ref 0–1)
BILIRUB SERPL-MCNC: 1.28 MG/DL (ref 0.2–1)
BILIRUB UR QL STRIP: NEGATIVE
BUN SERPL-MCNC: 15 MG/DL (ref 5–25)
CALCIUM SERPL-MCNC: 9.4 MG/DL (ref 8.4–10.2)
CHLORIDE SERPL-SCNC: 108 MMOL/L (ref 96–108)
CHOLEST SERPL-MCNC: 149 MG/DL (ref ?–200)
CLARITY UR: CLEAR
CO2 SERPL-SCNC: 30 MMOL/L (ref 21–32)
COLOR UR: ABNORMAL
CREAT SERPL-MCNC: 0.67 MG/DL (ref 0.6–1.3)
EOSINOPHIL # BLD AUTO: 0.12 THOUSAND/ÂΜL (ref 0–0.61)
EOSINOPHIL NFR BLD AUTO: 2 % (ref 0–6)
ERYTHROCYTE [DISTWIDTH] IN BLOOD BY AUTOMATED COUNT: 12.4 % (ref 11.6–15.1)
EST. AVERAGE GLUCOSE BLD GHB EST-MCNC: 111 MG/DL
GFR SERPL CREATININE-BSD FRML MDRD: 92 ML/MIN/1.73SQ M
GLUCOSE P FAST SERPL-MCNC: 94 MG/DL (ref 65–99)
GLUCOSE UR STRIP-MCNC: NEGATIVE MG/DL
HBA1C MFR BLD: 5.5 %
HCT VFR BLD AUTO: 43.2 % (ref 34.8–46.1)
HDLC SERPL-MCNC: 60 MG/DL
HGB BLD-MCNC: 14.1 G/DL (ref 11.5–15.4)
HGB UR QL STRIP.AUTO: ABNORMAL
IMM GRANULOCYTES # BLD AUTO: 0.01 THOUSAND/UL (ref 0–0.2)
IMM GRANULOCYTES NFR BLD AUTO: 0 % (ref 0–2)
KETONES UR STRIP-MCNC: NEGATIVE MG/DL
LDLC SERPL CALC-MCNC: 71 MG/DL (ref 0–100)
LEUKOCYTE ESTERASE UR QL STRIP: NEGATIVE
LYMPHOCYTES # BLD AUTO: 0.9 THOUSANDS/ÂΜL (ref 0.6–4.47)
LYMPHOCYTES NFR BLD AUTO: 18 % (ref 14–44)
MCH RBC QN AUTO: 29.4 PG (ref 26.8–34.3)
MCHC RBC AUTO-ENTMCNC: 32.6 G/DL (ref 31.4–37.4)
MCV RBC AUTO: 90 FL (ref 82–98)
MONOCYTES # BLD AUTO: 0.45 THOUSAND/ÂΜL (ref 0.17–1.22)
MONOCYTES NFR BLD AUTO: 9 % (ref 4–12)
MUCOUS THREADS UR QL AUTO: ABNORMAL
NEUTROPHILS # BLD AUTO: 3.4 THOUSANDS/ÂΜL (ref 1.85–7.62)
NEUTS SEG NFR BLD AUTO: 70 % (ref 43–75)
NITRITE UR QL STRIP: NEGATIVE
NON-SQ EPI CELLS URNS QL MICRO: ABNORMAL /HPF
NONHDLC SERPL-MCNC: 89 MG/DL
NRBC BLD AUTO-RTO: 0 /100 WBCS
PH UR STRIP.AUTO: 6 [PH]
PLATELET # BLD AUTO: 231 THOUSANDS/UL (ref 149–390)
PMV BLD AUTO: 9.8 FL (ref 8.9–12.7)
POTASSIUM SERPL-SCNC: 4.2 MMOL/L (ref 3.5–5.3)
PROT SERPL-MCNC: 7.2 G/DL (ref 6.4–8.4)
PROT UR STRIP-MCNC: NEGATIVE MG/DL
RBC # BLD AUTO: 4.79 MILLION/UL (ref 3.81–5.12)
RBC #/AREA URNS AUTO: ABNORMAL /HPF
SODIUM SERPL-SCNC: 142 MMOL/L (ref 135–147)
SP GR UR STRIP.AUTO: 1.02 (ref 1–1.03)
TRIGL SERPL-MCNC: 92 MG/DL (ref ?–150)
TSH SERPL DL<=0.05 MIU/L-ACNC: 1.15 UIU/ML (ref 0.45–4.5)
UROBILINOGEN UR STRIP-ACNC: <2 MG/DL
WBC # BLD AUTO: 4.92 THOUSAND/UL (ref 4.31–10.16)
WBC #/AREA URNS AUTO: ABNORMAL /HPF

## 2024-11-13 PROCEDURE — 84443 ASSAY THYROID STIM HORMONE: CPT

## 2024-11-13 PROCEDURE — 80061 LIPID PANEL: CPT

## 2024-11-13 PROCEDURE — 85025 COMPLETE CBC W/AUTO DIFF WBC: CPT

## 2024-11-13 PROCEDURE — 83036 HEMOGLOBIN GLYCOSYLATED A1C: CPT

## 2024-11-13 PROCEDURE — 81001 URINALYSIS AUTO W/SCOPE: CPT

## 2024-11-13 PROCEDURE — 80053 COMPREHEN METABOLIC PANEL: CPT

## 2024-11-13 PROCEDURE — 36415 COLL VENOUS BLD VENIPUNCTURE: CPT

## 2024-11-15 ENCOUNTER — RESULTS FOLLOW-UP (OUTPATIENT)
Dept: FAMILY MEDICINE CLINIC | Facility: CLINIC | Age: 64
End: 2024-11-15

## 2024-11-15 DIAGNOSIS — R31.29 MICROHEMATURIA: Primary | ICD-10-CM

## 2024-11-18 ENCOUNTER — APPOINTMENT (OUTPATIENT)
Dept: LAB | Facility: HOSPITAL | Age: 64
End: 2024-11-18
Payer: COMMERCIAL

## 2024-11-18 DIAGNOSIS — R31.29 MICROHEMATURIA: ICD-10-CM

## 2024-11-18 LAB
BACTERIA UR QL AUTO: ABNORMAL /HPF
BILIRUB UR QL STRIP: NEGATIVE
CLARITY UR: CLEAR
COLOR UR: ABNORMAL
GLUCOSE UR STRIP-MCNC: NEGATIVE MG/DL
HGB UR QL STRIP.AUTO: ABNORMAL
KETONES UR STRIP-MCNC: NEGATIVE MG/DL
LEUKOCYTE ESTERASE UR QL STRIP: NEGATIVE
MUCOUS THREADS UR QL AUTO: ABNORMAL
NITRITE UR QL STRIP: NEGATIVE
NON-SQ EPI CELLS URNS QL MICRO: ABNORMAL /HPF
PH UR STRIP.AUTO: 5 [PH]
PROT UR STRIP-MCNC: NEGATIVE MG/DL
RBC #/AREA URNS AUTO: ABNORMAL /HPF
SP GR UR STRIP.AUTO: 1.02 (ref 1–1.03)
UROBILINOGEN UR STRIP-ACNC: <2 MG/DL
WBC #/AREA URNS AUTO: ABNORMAL /HPF

## 2024-11-18 PROCEDURE — 81001 URINALYSIS AUTO W/SCOPE: CPT

## 2024-11-20 ENCOUNTER — HOSPITAL ENCOUNTER (OUTPATIENT)
Dept: MAMMOGRAPHY | Facility: MEDICAL CENTER | Age: 64
Discharge: HOME | End: 2024-11-20
Payer: COMMERCIAL

## 2024-11-20 VITALS — BODY MASS INDEX: 29.26 KG/M2 | WEIGHT: 159 LBS | HEIGHT: 62 IN

## 2024-11-20 DIAGNOSIS — Z12.31 ENCOUNTER FOR SCREENING MAMMOGRAM FOR MALIGNANT NEOPLASM OF BREAST: ICD-10-CM

## 2024-11-20 PROCEDURE — 77063 BREAST TOMOSYNTHESIS BI: CPT

## 2024-11-20 PROCEDURE — 77067 SCR MAMMO BI INCL CAD: CPT

## 2024-11-21 ENCOUNTER — RESULTS FOLLOW-UP (OUTPATIENT)
Dept: FAMILY MEDICINE CLINIC | Facility: CLINIC | Age: 64
End: 2024-11-21

## 2024-11-22 ENCOUNTER — RESULTS FOLLOW-UP (OUTPATIENT)
Dept: FAMILY MEDICINE CLINIC | Facility: CLINIC | Age: 64
End: 2024-11-22

## 2024-12-01 LAB — COLOGUARD RESULT REPORTABLE: NEGATIVE

## 2025-01-20 DIAGNOSIS — Z95.5 H/O HEART ARTERY STENT: ICD-10-CM

## 2025-01-20 DIAGNOSIS — I25.10 CORONARY ARTERIOSCLEROSIS: ICD-10-CM

## 2025-01-20 DIAGNOSIS — I10 HYPERTENSION, UNSPECIFIED TYPE: ICD-10-CM

## 2025-01-20 DIAGNOSIS — I25.2 HISTORY OF MYOCARDIAL INFARCTION: ICD-10-CM

## 2025-01-21 RX ORDER — ATORVASTATIN CALCIUM 40 MG/1
40 TABLET, FILM COATED ORAL DAILY
Qty: 90 TABLET | Refills: 1 | Status: SHIPPED | OUTPATIENT
Start: 2025-01-21

## 2025-01-21 RX ORDER — METOPROLOL SUCCINATE 25 MG/1
25 TABLET, EXTENDED RELEASE ORAL DAILY
Qty: 90 TABLET | Refills: 3 | Status: SHIPPED | OUTPATIENT
Start: 2025-01-21

## 2025-01-21 RX ORDER — AMLODIPINE BESYLATE 5 MG/1
5 TABLET ORAL DAILY
Qty: 90 TABLET | Refills: 3 | Status: SHIPPED | OUTPATIENT
Start: 2025-01-21

## 2025-05-14 ENCOUNTER — OFFICE VISIT (OUTPATIENT)
Dept: FAMILY MEDICINE CLINIC | Facility: CLINIC | Age: 65
End: 2025-05-14
Payer: COMMERCIAL

## 2025-05-14 VITALS
OXYGEN SATURATION: 99 % | SYSTOLIC BLOOD PRESSURE: 132 MMHG | WEIGHT: 158 LBS | DIASTOLIC BLOOD PRESSURE: 74 MMHG | HEART RATE: 62 BPM | BODY MASS INDEX: 28.9 KG/M2

## 2025-05-14 DIAGNOSIS — E78.2 MIXED HYPERLIPIDEMIA: ICD-10-CM

## 2025-05-14 DIAGNOSIS — I25.10 CORONARY ARTERIOSCLEROSIS: ICD-10-CM

## 2025-05-14 DIAGNOSIS — Z13.0 SCREENING FOR DEFICIENCY ANEMIA: Primary | ICD-10-CM

## 2025-05-14 DIAGNOSIS — Z13.29 SCREENING FOR HYPOTHYROIDISM: ICD-10-CM

## 2025-05-14 DIAGNOSIS — Z13.1 SCREENING FOR DIABETES MELLITUS: ICD-10-CM

## 2025-05-14 DIAGNOSIS — Z12.31 ENCOUNTER FOR SCREENING MAMMOGRAM FOR BREAST CANCER: ICD-10-CM

## 2025-05-14 DIAGNOSIS — I10 ESSENTIAL HYPERTENSION: ICD-10-CM

## 2025-05-14 DIAGNOSIS — Z13.89 SCREENING FOR BLOOD OR PROTEIN IN URINE: ICD-10-CM

## 2025-05-14 DIAGNOSIS — E55.9 VITAMIN D DEFICIENCY: ICD-10-CM

## 2025-05-14 PROCEDURE — 99214 OFFICE O/P EST MOD 30 MIN: CPT | Performed by: INTERNAL MEDICINE

## 2025-05-14 NOTE — PROGRESS NOTES
Name: Bouchra Vega      : 1960      MRN: [id number]  Encounter Provider: Donavon Perrin MD  Encounter Date: 2025   Encounter department: [facility] PRIMARY CARE    Assessment & Plan  Screening for deficiency anemia    Orders:    CBC and differential; Future    Mixed hyperlipidemia  Recheck lipid panel.  Continue current dose of atorvastatin.  Orders:    Lipid panel; Future    Essential hypertension  Blood pressure is very well-controlled on current meds.  Continue to monitor.  Orders:    Comprehensive metabolic panel; Future    Coronary arteriosclerosis  Continue follow-up with cardiology.  No current symptoms.       Vitamin D deficiency    Orders:    Vitamin D 25 hydroxy; Future    Screening for diabetes mellitus    Orders:    Hemoglobin A1C; Future    Screening for blood or protein in urine    Orders:    UA (URINE) with reflex to Scope; Future    Screening for hypothyroidism    Orders:    TSH, 3rd generation with Free T4 reflex; Future    Encounter for screening mammogram for breast cancer    Orders:    Mammo screening bilateral w cad; Future         History of Present Illness     Patient came today for follow-up on her chronic medical problems.      Review of Systems   Constitutional:  Negative for chills and fever.   Respiratory:  Negative for cough, shortness of breath and wheezing.    Cardiovascular:  Negative for chest pain, palpitations and leg swelling.     Past Medical History:   Diagnosis Date    Carcinoid tumor of lung     Carcinoid lung neoplasm - stage 1 w resection ; last assessed - 2013    Ovarian cancer (HCC) [id number]    Right stage 1; 2013    Thrombophlebitis of arm, left     Thrombophlebitis left; last assessed - [id number]     Past Surgical History:   Procedure Laterality Date    BREAST BIOPSY Right     Biopsy Breast Percutaneous needle core    BREAST BIOPSY Left         CLOSED REDUCTION WRIST FRACTURE Left 2011     COLONOSCOPY  2009    Complete Colonoscopy    CORONARY ANGIOPLASTY WITH STENT PLACEMENT      HYSTERECTOMY      age 48    LUNG LOBECTOMY      RLL w/early stage carcinoid tumor; Resolved - Approx [id number]    TOTAL ABDOMINAL HYSTERECTOMY W/ BILATERAL SALPINGOOPHORECTOMY       Family History   Problem Relation Age of Onset    Breast cancer Mother 75    Diabetes Mother         Diabetes Mellitus    No Known Problems Father     No Known Problems Sister     Breast cancer Sister 50    No Known Problems Sister     Breast cancer Sister 53    No Known Problems Daughter     No Known Problems Maternal Grandmother     No Known Problems Maternal Grandfather     No Known Problems Paternal Grandmother     No Known Problems Paternal Grandfather      Social History     Tobacco Use    Smoking status: Former     Current packs/day: 0.00     Types: Cigarettes     Quit date: 2007     Years since quittin.3    Smokeless tobacco: Never   Vaping Use    Vaping status: Never Used   Substance and Sexual Activity    Alcohol use: Yes     Comment: minimum alcohol consumption    Drug use: Never    Sexual activity: Not Currently     Medications[1]  Allergies   Allergen Reactions    Contrast  [Iodinated Contrast Media] Hives    Iodine - Food Allergy      Other reaction(s): Other (See Comments)  Contrast dye    Niacin      Annotation - [id number]: felt poor w med     Immunization History   Administered Date(s) Administered    COVID-19 MODERNA VACC 0.5 ML IM 2021, 2021    INFLUENZA 10/01/2021    Influenza Quadrivalent, 6-35 Months IM 2016    Influenza Split High Dose Preservative Free IM 2024    Influenza, injectable, quadrivalent, preservative free 0.5 mL 10/24/2023    Influenza, recombinant, quadrivalent,injectable, preservative free 2020    Influenza, seasonal, injectable 12/10/2012    Pneumococcal Polysaccharide PPV23 2019     Objective   /74   Pulse 62   Wt 71.7 kg (158 lb)   SpO2 99%   BMI  28.90 kg/m²     Physical Exam  Constitutional:       General: She is not in acute distress.     Appearance: She is not toxic-appearing.     Cardiovascular:      Heart sounds: No murmur heard.     No gallop.   Pulmonary:      Effort: No respiratory distress.      Breath sounds: No wheezing or rales.              [1]   Current Outpatient Medications on File Prior to Visit   Medication Sig    amLODIPine (NORVASC) 5 mg tablet TAKE 1 TABLET DAILY    aspirin 81 MG tablet Take 1 tablet by mouth in the morning.    atorvastatin (LIPITOR) 40 mg tablet TAKE 1 TABLET DAILY    Cholecalciferol (VITAMIN D3) 1000 units CAPS Take 1 capsule by mouth in the morning.    Diclofenac Sodium (VOLTAREN) 1 % Apply 2 g topically 2 (two) times a day as needed (achilles pain)    Magnesium 250 MG TABS Take 1 tablet by mouth Taking 400 mg daily    metoprolol succinate (TOPROL-XL) 25 mg 24 hr tablet TAKE 1 TABLET DAILY    Omega-3 Fatty Acids (FISH OIL) 1200 MG CAPS Take 2 capsules by mouth in the morning.

## 2025-05-14 NOTE — ASSESSMENT & PLAN NOTE
Blood pressure is very well-controlled on current meds.  Continue to monitor.  Orders:    Comprehensive metabolic panel; Future

## 2025-07-18 DIAGNOSIS — I25.10 CORONARY ARTERIOSCLEROSIS: ICD-10-CM

## 2025-07-18 DIAGNOSIS — I25.2 HISTORY OF MYOCARDIAL INFARCTION: ICD-10-CM

## 2025-07-18 RX ORDER — ATORVASTATIN CALCIUM 40 MG/1
40 TABLET, FILM COATED ORAL DAILY
Qty: 90 TABLET | Refills: 1 | Status: SHIPPED | OUTPATIENT
Start: 2025-07-18